# Patient Record
Sex: FEMALE | Race: WHITE | NOT HISPANIC OR LATINO | Employment: UNEMPLOYED | URBAN - METROPOLITAN AREA
[De-identification: names, ages, dates, MRNs, and addresses within clinical notes are randomized per-mention and may not be internally consistent; named-entity substitution may affect disease eponyms.]

---

## 2018-04-23 ENCOUNTER — HISTORICAL (OUTPATIENT)
Dept: LAB | Facility: HOSPITAL | Age: 38
End: 2018-04-23

## 2018-04-23 LAB
ABS NEUT (OLG): 5.03 X10(3)/MCL (ref 2.1–9.2)
ALBUMIN SERPL-MCNC: 4 GM/DL (ref 3.4–5)
ALBUMIN/GLOB SERPL: 1.3 {RATIO}
ALP SERPL-CCNC: 74 UNIT/L (ref 38–126)
ALT SERPL-CCNC: 69 UNIT/L (ref 12–78)
APPEARANCE, UA: CLEAR
AST SERPL-CCNC: 69 UNIT/L (ref 15–37)
BACTERIA SPEC CULT: NORMAL /HPF
BASOPHILS # BLD AUTO: 0 X10(3)/MCL (ref 0–0.2)
BASOPHILS NFR BLD AUTO: 1 %
BILIRUB SERPL-MCNC: 0.4 MG/DL (ref 0.2–1)
BILIRUB UR QL STRIP: NEGATIVE
BILIRUBIN DIRECT+TOT PNL SERPL-MCNC: 0.1 MG/DL (ref 0–0.2)
BILIRUBIN DIRECT+TOT PNL SERPL-MCNC: 0.3 MG/DL (ref 0–0.8)
BUN SERPL-MCNC: 12 MG/DL (ref 7–18)
CALCIUM SERPL-MCNC: 9.1 MG/DL (ref 8.5–10.1)
CHLORIDE SERPL-SCNC: 107 MMOL/L (ref 98–107)
CHOLEST SERPL-MCNC: 209 MG/DL (ref 0–200)
CHOLEST/HDLC SERPL: 5.8 {RATIO} (ref 0–4)
CO2 SERPL-SCNC: 23 MMOL/L (ref 21–32)
COLOR UR: YELLOW
CREAT SERPL-MCNC: 0.63 MG/DL (ref 0.55–1.02)
DEPRECATED CALCIDIOL+CALCIFEROL SERPL-MC: 18 NG/ML (ref 30–80)
EOSINOPHIL # BLD AUTO: 0.1 X10(3)/MCL (ref 0–0.9)
EOSINOPHIL NFR BLD AUTO: 1 %
ERYTHROCYTE [DISTWIDTH] IN BLOOD BY AUTOMATED COUNT: 12 % (ref 11.5–17)
EST. AVERAGE GLUCOSE BLD GHB EST-MCNC: 114 MG/DL
GLOBULIN SER-MCNC: 3.1 GM/DL (ref 2.4–3.5)
GLUCOSE (UA): NEGATIVE
GLUCOSE SERPL-MCNC: 101 MG/DL (ref 74–106)
HBA1C MFR BLD: 5.6 % (ref 4.2–6.3)
HCT VFR BLD AUTO: 41.1 % (ref 37–47)
HDLC SERPL-MCNC: 36 MG/DL (ref 35–60)
HGB BLD-MCNC: 13.2 GM/DL (ref 12–16)
HGB UR QL STRIP: NEGATIVE
KETONES UR QL STRIP: NEGATIVE
LDLC SERPL CALC-MCNC: 114 MG/DL (ref 0–129)
LEUKOCYTE ESTERASE UR QL STRIP: NEGATIVE
LYMPHOCYTES # BLD AUTO: 1.9 X10(3)/MCL (ref 0.6–4.6)
LYMPHOCYTES NFR BLD AUTO: 25 %
MCH RBC QN AUTO: 28.6 PG (ref 27–31)
MCHC RBC AUTO-ENTMCNC: 32.1 GM/DL (ref 33–36)
MCV RBC AUTO: 89 FL (ref 80–94)
MONOCYTES # BLD AUTO: 0.7 X10(3)/MCL (ref 0.1–1.3)
MONOCYTES NFR BLD AUTO: 9 %
NEUTROPHILS # BLD AUTO: 5.03 X10(3)/MCL (ref 1.4–7.9)
NEUTROPHILS NFR BLD AUTO: 64 %
NITRITE UR QL STRIP: NEGATIVE
PH UR STRIP: 7 [PH] (ref 5–9)
PLATELET # BLD AUTO: 281 X10(3)/MCL (ref 130–400)
PMV BLD AUTO: 9.8 FL (ref 9.4–12.4)
POTASSIUM SERPL-SCNC: 4.3 MMOL/L (ref 3.5–5.1)
PROT SERPL-MCNC: 7.1 GM/DL (ref 6.4–8.2)
PROT UR QL STRIP: NEGATIVE
RBC # BLD AUTO: 4.62 X10(6)/MCL (ref 4.2–5.4)
RBC #/AREA URNS HPF: NORMAL /[HPF]
SODIUM SERPL-SCNC: 140 MMOL/L (ref 136–145)
SP GR UR STRIP: 1.01 (ref 1–1.03)
SQUAMOUS EPITHELIAL, UA: 1 /HPF (ref 0–4)
TRIGL SERPL-MCNC: 295 MG/DL (ref 30–150)
TSH SERPL-ACNC: 1.14 MIU/L (ref 0.36–3.74)
UA WBC MAN: NORMAL
UROBILINOGEN UR STRIP-ACNC: 0.2
VLDLC SERPL CALC-MCNC: 59 MG/DL
WBC # SPEC AUTO: 7.8 X10(3)/MCL (ref 4.5–11.5)
WBC #/AREA URNS HPF: NORMAL /[HPF]

## 2019-05-29 ENCOUNTER — HISTORICAL (OUTPATIENT)
Dept: ADMINISTRATIVE | Facility: HOSPITAL | Age: 39
End: 2019-05-29

## 2019-05-29 LAB
ABS NEUT (OLG): 4.57 X10(3)/MCL (ref 2.1–9.2)
ALBUMIN SERPL-MCNC: 4 GM/DL (ref 3.4–5)
ALBUMIN/GLOB SERPL: 1.2 RATIO (ref 1.1–2)
ALP SERPL-CCNC: 80 UNIT/L (ref 38–126)
ALT SERPL-CCNC: 47 UNIT/L (ref 12–78)
APPEARANCE, UA: CLEAR
AST SERPL-CCNC: 25 UNIT/L (ref 15–37)
BACTERIA SPEC CULT: NORMAL /HPF
BASOPHILS # BLD AUTO: 0 X10(3)/MCL (ref 0–0.2)
BASOPHILS NFR BLD AUTO: 1 %
BILIRUB SERPL-MCNC: 0.7 MG/DL (ref 0.2–1)
BILIRUB UR QL STRIP: NEGATIVE
BILIRUBIN DIRECT+TOT PNL SERPL-MCNC: 0.1 MG/DL (ref 0–0.5)
BILIRUBIN DIRECT+TOT PNL SERPL-MCNC: 0.6 MG/DL (ref 0–0.8)
BUN SERPL-MCNC: 10 MG/DL (ref 7–18)
CALCIUM SERPL-MCNC: 9.3 MG/DL (ref 8.5–10.1)
CHLORIDE SERPL-SCNC: 104 MMOL/L (ref 98–107)
CHOLEST SERPL-MCNC: 226 MG/DL (ref 0–200)
CHOLEST/HDLC SERPL: 5.1 {RATIO} (ref 0–4)
CO2 SERPL-SCNC: 22 MMOL/L (ref 21–32)
COLOR UR: YELLOW
CREAT SERPL-MCNC: 0.68 MG/DL (ref 0.55–1.02)
EOSINOPHIL # BLD AUTO: 0.1 X10(3)/MCL (ref 0–0.9)
EOSINOPHIL NFR BLD AUTO: 1 %
ERYTHROCYTE [DISTWIDTH] IN BLOOD BY AUTOMATED COUNT: 11.8 % (ref 11.5–17)
EST. AVERAGE GLUCOSE BLD GHB EST-MCNC: 111 MG/DL
GLOBULIN SER-MCNC: 3.4 GM/DL (ref 2.4–3.5)
GLUCOSE (UA): NEGATIVE
GLUCOSE SERPL-MCNC: 105 MG/DL (ref 74–106)
HBA1C MFR BLD: 5.5 % (ref 4.2–6.3)
HCT VFR BLD AUTO: 42.3 % (ref 37–47)
HDLC SERPL-MCNC: 44 MG/DL (ref 35–60)
HGB BLD-MCNC: 13.5 GM/DL (ref 12–16)
HGB UR QL STRIP: NEGATIVE
KETONES UR QL STRIP: NEGATIVE
LDLC SERPL CALC-MCNC: 133 MG/DL (ref 0–129)
LEUKOCYTE ESTERASE UR QL STRIP: NEGATIVE
LYMPHOCYTES # BLD AUTO: 2 X10(3)/MCL (ref 0.6–4.6)
LYMPHOCYTES NFR BLD AUTO: 27 %
MCH RBC QN AUTO: 27.8 PG (ref 27–31)
MCHC RBC AUTO-ENTMCNC: 31.9 GM/DL (ref 33–36)
MCV RBC AUTO: 87 FL (ref 80–94)
MONOCYTES # BLD AUTO: 0.6 X10(3)/MCL (ref 0.1–1.3)
MONOCYTES NFR BLD AUTO: 8 %
NEUTROPHILS # BLD AUTO: 4.57 X10(3)/MCL (ref 2.1–9.2)
NEUTROPHILS NFR BLD AUTO: 62 %
NITRITE UR QL STRIP: NEGATIVE
PH UR STRIP: 6.5 [PH] (ref 5–9)
PLATELET # BLD AUTO: 309 X10(3)/MCL (ref 130–400)
PMV BLD AUTO: 9.9 FL (ref 9.4–12.4)
POTASSIUM SERPL-SCNC: 4.4 MMOL/L (ref 3.5–5.1)
PROT SERPL-MCNC: 7.4 GM/DL (ref 6.4–8.2)
PROT UR QL STRIP: NEGATIVE
RBC # BLD AUTO: 4.86 X10(6)/MCL (ref 4.2–5.4)
RBC #/AREA URNS HPF: NORMAL /[HPF]
SODIUM SERPL-SCNC: 136 MMOL/L (ref 136–145)
SP GR UR STRIP: 1.01 (ref 1–1.03)
SQUAMOUS EPITHELIAL, UA: NORMAL
TRIGL SERPL-MCNC: 244 MG/DL (ref 30–150)
TSH SERPL-ACNC: 0.83 MIU/L (ref 0.36–3.74)
UROBILINOGEN UR STRIP-ACNC: 0.2
VLDLC SERPL CALC-MCNC: 49 MG/DL
WBC # SPEC AUTO: 7.4 X10(3)/MCL (ref 4.5–11.5)
WBC #/AREA URNS HPF: NORMAL /[HPF]

## 2020-01-14 ENCOUNTER — HISTORICAL (OUTPATIENT)
Dept: ADMINISTRATIVE | Facility: HOSPITAL | Age: 40
End: 2020-01-14

## 2020-01-14 LAB
ALBUMIN SERPL-MCNC: 4.1 GM/DL (ref 3.4–5)
ALP SERPL-CCNC: 79 UNIT/L (ref 38–126)
ALT SERPL-CCNC: 47 UNIT/L (ref 12–78)
AST SERPL-CCNC: 21 UNIT/L (ref 15–37)
BILIRUB SERPL-MCNC: 1.1 MG/DL (ref 0.2–1)
BILIRUBIN DIRECT+TOT PNL SERPL-MCNC: 0.1 MG/DL (ref 0–0.2)
BILIRUBIN DIRECT+TOT PNL SERPL-MCNC: 1 MG/DL (ref 0–0.8)
CHOLEST SERPL-MCNC: 227 MG/DL (ref 0–200)
CHOLEST/HDLC SERPL: 5.2 {RATIO} (ref 0–4)
DEPRECATED CALCIDIOL+CALCIFEROL SERPL-MC: 26.28 NG/ML (ref 30–80)
HDLC SERPL-MCNC: 44 MG/DL (ref 35–60)
LDLC SERPL CALC-MCNC: 140 MG/DL (ref 0–129)
LIVER PROFILE INTERP: ABNORMAL
PROT SERPL-MCNC: 7 GM/DL (ref 6.4–8.2)
TRIGL SERPL-MCNC: 216 MG/DL (ref 30–150)
VLDLC SERPL CALC-MCNC: 43 MG/DL

## 2020-09-23 ENCOUNTER — HISTORICAL (OUTPATIENT)
Dept: ADMINISTRATIVE | Facility: HOSPITAL | Age: 40
End: 2020-09-23

## 2020-10-06 ENCOUNTER — HISTORICAL (OUTPATIENT)
Dept: ADMINISTRATIVE | Facility: HOSPITAL | Age: 40
End: 2020-10-06

## 2021-01-15 ENCOUNTER — HISTORICAL (OUTPATIENT)
Dept: ADMINISTRATIVE | Facility: HOSPITAL | Age: 41
End: 2021-01-15

## 2021-01-15 LAB
ABS NEUT (OLG): 4.67 X10(3)/MCL (ref 2.1–9.2)
ALBUMIN SERPL-MCNC: 4.5 GM/DL (ref 3.5–5)
ALBUMIN/GLOB SERPL: 1.6 RATIO (ref 1.1–2)
ALP SERPL-CCNC: 53 UNIT/L (ref 40–150)
ALT SERPL-CCNC: 16 UNIT/L (ref 0–55)
APPEARANCE, UA: CLEAR
AST SERPL-CCNC: 15 UNIT/L (ref 5–34)
BACTERIA SPEC CULT: NORMAL /HPF
BASOPHILS # BLD AUTO: 0 X10(3)/MCL (ref 0–0.2)
BASOPHILS NFR BLD AUTO: 1 %
BILIRUB SERPL-MCNC: 0.5 MG/DL
BILIRUB UR QL STRIP: NEGATIVE
BILIRUBIN DIRECT+TOT PNL SERPL-MCNC: 0.2 MG/DL (ref 0–0.5)
BILIRUBIN DIRECT+TOT PNL SERPL-MCNC: 0.3 MG/DL (ref 0–0.8)
BUN SERPL-MCNC: 15.5 MG/DL (ref 7–18.7)
CALCIUM SERPL-MCNC: 9.2 MG/DL (ref 8.4–10.2)
CHLORIDE SERPL-SCNC: 107 MMOL/L (ref 98–107)
CHOLEST SERPL-MCNC: 197 MG/DL
CHOLEST/HDLC SERPL: 4 {RATIO} (ref 0–5)
CO2 SERPL-SCNC: 23 MMOL/L (ref 22–29)
COLOR UR: YELLOW
CREAT SERPL-MCNC: 0.69 MG/DL (ref 0.55–1.02)
DEPRECATED CALCIDIOL+CALCIFEROL SERPL-MC: 22.6 NG/ML (ref 30–80)
EOSINOPHIL # BLD AUTO: 0.1 X10(3)/MCL (ref 0–0.9)
EOSINOPHIL NFR BLD AUTO: 1 %
ERYTHROCYTE [DISTWIDTH] IN BLOOD BY AUTOMATED COUNT: 11.9 % (ref 11.5–17)
EST. AVERAGE GLUCOSE BLD GHB EST-MCNC: 105.4 MG/DL
GLOBULIN SER-MCNC: 2.8 GM/DL (ref 2.4–3.5)
GLUCOSE (UA): NEGATIVE
GLUCOSE SERPL-MCNC: 99 MG/DL (ref 74–100)
HBA1C MFR BLD: 5.3 %
HCT VFR BLD AUTO: 41.1 % (ref 37–47)
HDLC SERPL-MCNC: 47 MG/DL (ref 35–60)
HGB BLD-MCNC: 13.4 GM/DL (ref 12–16)
HGB UR QL STRIP: NEGATIVE
KETONES UR QL STRIP: NEGATIVE
LDLC SERPL CALC-MCNC: 129 MG/DL (ref 50–140)
LEUKOCYTE ESTERASE UR QL STRIP: NEGATIVE
LYMPHOCYTES # BLD AUTO: 1.9 X10(3)/MCL (ref 0.6–4.6)
LYMPHOCYTES NFR BLD AUTO: 26 %
MCH RBC QN AUTO: 28 PG (ref 27–31)
MCHC RBC AUTO-ENTMCNC: 32.6 GM/DL (ref 33–36)
MCV RBC AUTO: 86 FL (ref 80–94)
MONOCYTES # BLD AUTO: 0.6 X10(3)/MCL (ref 0.1–1.3)
MONOCYTES NFR BLD AUTO: 8 %
NEUTROPHILS # BLD AUTO: 4.67 X10(3)/MCL (ref 2.1–9.2)
NEUTROPHILS NFR BLD AUTO: 64 %
NITRITE UR QL STRIP: NEGATIVE
PH UR STRIP: 6.5 [PH] (ref 5–9)
PLATELET # BLD AUTO: 304 X10(3)/MCL (ref 130–400)
PMV BLD AUTO: 9.7 FL (ref 9.4–12.4)
POTASSIUM SERPL-SCNC: 4.6 MMOL/L (ref 3.5–5.1)
PROT SERPL-MCNC: 7.3 GM/DL (ref 6.4–8.3)
PROT UR QL STRIP: NEGATIVE
RBC # BLD AUTO: 4.78 X10(6)/MCL (ref 4.2–5.4)
RBC #/AREA URNS HPF: NORMAL /[HPF]
SODIUM SERPL-SCNC: 137 MMOL/L (ref 136–145)
SP GR UR STRIP: 1.01 (ref 1–1.03)
SQUAMOUS EPITHELIAL, UA: NORMAL
TRIGL SERPL-MCNC: 107 MG/DL (ref 37–140)
TSH SERPL-ACNC: 0.82 UIU/ML (ref 0.35–4.94)
UROBILINOGEN UR STRIP-ACNC: 0.2
VLDLC SERPL CALC-MCNC: 21 MG/DL
WBC # SPEC AUTO: 7.3 X10(3)/MCL (ref 4.5–11.5)
WBC #/AREA URNS HPF: NORMAL /[HPF]

## 2022-02-02 ENCOUNTER — HISTORICAL (OUTPATIENT)
Dept: ADMINISTRATIVE | Facility: HOSPITAL | Age: 42
End: 2022-02-02

## 2022-02-02 LAB
ABS NEUT (OLG): 4.54 (ref 2.1–9.2)
ALBUMIN SERPL-MCNC: 4.3 G/DL (ref 3.5–5)
ALBUMIN/GLOB SERPL: 1.3 {RATIO} (ref 1.1–2)
ALP SERPL-CCNC: 65 U/L (ref 40–150)
ALT SERPL-CCNC: 25 U/L (ref 0–55)
APPEARANCE, UA: CLEAR
AST SERPL-CCNC: 22 U/L (ref 5–34)
BACTERIA SPEC CULT: NORMAL
BASOPHILS # BLD AUTO: 0.1 10*3/UL (ref 0–0.2)
BASOPHILS NFR BLD AUTO: 1 %
BILIRUB SERPL-MCNC: 0.6 MG/DL
BILIRUB UR QL STRIP: NEGATIVE
BILIRUBIN DIRECT+TOT PNL SERPL-MCNC: 0.2 (ref 0–0.5)
BILIRUBIN DIRECT+TOT PNL SERPL-MCNC: 0.4 (ref 0–0.8)
BUDDING YEAST: NORMAL
BUN SERPL-MCNC: 10.9 MG/DL (ref 7–18.7)
CALCIUM SERPL-MCNC: 9.3 MG/DL (ref 8.7–10.5)
CASTS, UA: NORMAL
CHLORIDE SERPL-SCNC: 106 MMOL/L (ref 98–107)
CHOLEST SERPL-MCNC: 239 MG/DL
CHOLEST/HDLC SERPL: 5 {RATIO} (ref 0–5)
CO2 SERPL-SCNC: 22 MMOL/L (ref 22–29)
COLOR UR: YELLOW
CREAT SERPL-MCNC: 0.66 MG/DL (ref 0.55–1.02)
CRYSTALS: NORMAL
DEPRECATED CALCIDIOL+CALCIFEROL SERPL-MC: 21 NG/ML (ref 30–80)
EOSINOPHIL # BLD AUTO: 0 10*3/UL (ref 0–0.9)
EOSINOPHIL NFR BLD AUTO: 1 %
ERYTHROCYTE [DISTWIDTH] IN BLOOD BY AUTOMATED COUNT: 11.9 % (ref 11.5–17)
GLOBULIN SER-MCNC: 3.2 G/DL (ref 2.4–3.5)
GLUCOSE (UA): NEGATIVE
GLUCOSE SERPL-MCNC: 94 MG/DL (ref 74–100)
HCT VFR BLD AUTO: 40.3 % (ref 37–47)
HDLC SERPL-MCNC: 49 MG/DL (ref 35–60)
HEMOLYSIS INTERF INDEX SERPL-ACNC: 2
HGB BLD-MCNC: 13.1 G/DL (ref 12–16)
HGB UR QL STRIP: NEGATIVE
ICTERIC INTERF INDEX SERPL-ACNC: 1
KETONES UR QL STRIP: NEGATIVE
LDLC SERPL CALC-MCNC: 136 MG/DL (ref 50–140)
LEUKOCYTE ESTERASE UR QL STRIP: NEGATIVE
LIPEMIC INTERF INDEX SERPL-ACNC: 9
LYMPHOCYTES # BLD AUTO: 1.7 10*3/UL (ref 0.6–4.6)
LYMPHOCYTES NFR BLD AUTO: 24 %
MANUAL DIFF? (OHS): NO
MCH RBC QN AUTO: 27.8 PG (ref 27–31)
MCHC RBC AUTO-ENTMCNC: 32.5 G/DL (ref 33–36)
MCV RBC AUTO: 85.6 FL (ref 80–94)
MONOCYTES # BLD AUTO: 0.5 10*3/UL (ref 0.1–1.3)
MONOCYTES NFR BLD AUTO: 8 %
NEUTROPHILS # BLD AUTO: 4.54 10*3/UL (ref 2.1–9.2)
NEUTROPHILS NFR BLD AUTO: 66 %
NITRITE UR QL STRIP: NEGATIVE
PH UR STRIP: 6.5 [PH] (ref 5–9)
PLATELET # BLD AUTO: 337 10*3/UL (ref 130–400)
PMV BLD AUTO: 9.6 FL (ref 9.4–12.4)
POTASSIUM SERPL-SCNC: 3.9 MMOL/L (ref 3.5–5.1)
PROT SERPL-MCNC: 7.5 G/DL (ref 6.4–8.3)
PROT UR QL STRIP: NEGATIVE
RBC # BLD AUTO: 4.71 10*6/UL (ref 4.2–5.4)
RBC #/AREA URNS HPF: NORMAL /[HPF] (ref 0–2)
SMALL ROUND CELLS, UA: NORMAL
SODIUM SERPL-SCNC: 137 MMOL/L (ref 136–145)
SP GR UR STRIP: 1.01 (ref 1–1.03)
SPERM URNS QL MICRO: NORMAL
SQUAMOUS EPITHELIAL, UA: NORMAL (ref 0–4)
TRIGL SERPL-MCNC: 271 MG/DL (ref 37–140)
TSH SERPL-ACNC: 0.84 M[IU]/L (ref 0.35–4.94)
UROBILINOGEN UR STRIP-ACNC: 0.2
VLDLC SERPL CALC-MCNC: 54 MG/DL
WBC # SPEC AUTO: 6.9 10*3/UL (ref 4.5–11.5)
WBC #/AREA URNS HPF: NORMAL /[HPF] (ref 0–2)

## 2022-02-16 ENCOUNTER — HISTORICAL (OUTPATIENT)
Dept: RADIOLOGY | Facility: HOSPITAL | Age: 42
End: 2022-02-16

## 2022-02-16 ENCOUNTER — HISTORICAL (OUTPATIENT)
Dept: ADMINISTRATIVE | Facility: HOSPITAL | Age: 42
End: 2022-02-16

## 2022-04-10 ENCOUNTER — HISTORICAL (OUTPATIENT)
Dept: ADMINISTRATIVE | Facility: HOSPITAL | Age: 42
End: 2022-04-10
Payer: COMMERCIAL

## 2022-04-26 VITALS
DIASTOLIC BLOOD PRESSURE: 74 MMHG | HEIGHT: 62 IN | OXYGEN SATURATION: 98 % | WEIGHT: 181.69 LBS | SYSTOLIC BLOOD PRESSURE: 108 MMHG | BODY MASS INDEX: 33.43 KG/M2

## 2022-05-02 NOTE — HISTORICAL [facility]ER
This is a historical note converted from Merry. Formatting and pictures may have been removed.  Please reference Merry for original formatting and attached multimedia. Chief Complaint  2 WEEK F/U RIGHT GREAT TOE FRACTURE HERE TODAY FOR X-RAYS.  History of Present Illness  40-year-old female presents here follow-up of great toe fracture. ?This was treated nonoperatively. ?Overall she is doing fairly well. ?Feels that her toe is improving.? Only issue is when somebody steps on it or clicks.? Otherwise she is using a hard soled shoe.? Pain is controlled currently and she is happy with her recovery thus far.  Review of Systems  Denies fevers, chills, chest pain, shortness of breath. Comprehensive review of systems performed and otherwise negative except as noted in HPI.  Physical Exam  Vitals & Measurements  T:?98.2? ?F (Oral)? BP:?121/80?  HT:?158.00?cm? WT:?84.820?kg? BMI:?33.98? LMP:?10/04/2020 00:00 CDT?  General: No acute distress, alert and oriented, healthy appearing?  HEENT: Head is atraumatic, mucous membranes are moist  Cardiovascular: Brisk capillary refill  Lungs: Breathing non-labored  Skin: no rashes appreciated  Neurologic: Sensation is grossly intact distally  ?  Right foot:  Brisk cap refill distally. ?Overall alignment is unchanged.? Sensation is intact distally.  Assessment/Plan  1.?Fracture of phalanx of right great toe?S92.401A  ?Patients right great toe fracture. ?Overall alignment is unchanged. ?Continue?to treat nonoperatively.? Continue hard soled shoe and marcus taping for the next 4 weeks. ?We will see her back in a month with repeat x-rays.  Ordered:  Clinic Follow up, *Est. 11/03/20 3:00:00 CST, Order for future visit, Fracture of phalanx of right great toe, Orthopaedics  Office/Outpatient Visit Level 3 Established [id number] [facility], Fracture of phalanx of right great toe, Orthopaedics Clinic, 10/06/20 11:27:00 CDT  XR Foot Right Minimum 3 Views, Routine, *Est. 11/03/20 3:00:00 CST, None,  Ambulatory, Rad Type, Order for future visit, Fracture of phalanx of right great toe, Not Scheduled, *Est. 20 3:00:00 CST  ?  Referrals  Clinic Follow up, *Est. 20 3:00:00 CST, Order for future visit, Fracture of phalanx of right great toe, [facility]   Problem List/Past Medical History  Ongoing  Knowledge deficit  Obesity  Historical  Anemia  none  Procedure/Surgical History  Low cervical  section (2013)  C-Sectiopn Xs 1  Cholecystectomy   Medications  bupivacaine 0.25% preservative-free injectable solution, 10 mL/EA, EPI, Once  Fish Oil oral capsule, 1 cap(s), Oral, Daily  Multivitamin (MVI) additive  red yeast rice 600 mg oral capsule, 1200 mg= 2 cap(s), Oral, Daily  tetanus/diphtheria/pertussis, acel (Tdap) 5 units-2 units-15.5 mcg/0.5 mL intramuscular suspension, 0.5 mL, IM, Once  Vitamin D3  Allergies  No Known Allergies  Social History  Abuse/Neglect  No, No, Yes, 10/06/2020  Alcohol - Denies Alcohol Use, 2013  Current, Wine, Liquor, 1-2 times per year, 2018  Employment/School  Unemployed, 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  Immunizations  Vaccine Date Status   tetanus/diphtheria/pertussis, acel(Tdap) 2013 Recorded   Health Maintenance  Health Maintenance  ???Pending?(in the next year)  ??? ??OverDue  ??? ? ? ?Cervical Cancer Screening due??10/15/17??and every 3??year(s)  ??? ? ? ?Diabetes Screening due??06/19/20??and every 1??year(s)  ??? ??Due?  ??? ? ? ?Influenza Vaccine  due??10/01/20??and every 1??day(s)  ??? ??Due In Future?  ??? ? ? ?Obesity Screening not due until??01/01/21??and every 1??year(s)  ??? ? ? ?Alcohol Misuse Screening not due until??01/02/21??and every 1??year(s)  ??? ? ? ?ADL Screening not due until??01/15/21??and every 1??year(s)  ??? ? ? ?Depression Screening not due until??05/29/21??and every 1??year(s)  ??? ? ? ?Blood Pressure Screening not due until??09/24/21??and every 1??year(s)  ??? ? ? ?Body Mass Index Check not due until??09/24/21??and every 1??year(s)  ???Satisfied?(in the past 1 year)  ??? ??Satisfied?  ??? ? ? ?ADL Screening on??01/15/20.??Satisfied by Columba Herbert LPN  ??? ? ? ?Alcohol Misuse Screening on??09/24/20.??Satisfied by Dorothea Brand  ??? ? ? ?Blood Pressure Screening on??10/06/20.??Satisfied by Dorothea Brand  ??? ? ? ?Body Mass Index Check on??10/06/20.??Satisfied by Dorothea Brand  ??? ? ? ?Cervical Cancer Screening on??01/15/20.??Satisfied by Boy Yarbrough MD  ??? ? ? ?Depression Screening on??05/29/20.??Satisfied by Columba Herbert LPN  ??? ? ? ?Obesity Screening on??10/06/20.??Satisfied by Dorothea Brand  ?  Diagnostic Results  AP lateral right great toe reviewed. ?Patients fracture?alignment is unchanged.? No callus thus far.

## 2022-11-08 ENCOUNTER — OFFICE VISIT (OUTPATIENT)
Dept: ORTHOPEDICS | Facility: CLINIC | Age: 42
End: 2022-11-08
Payer: COMMERCIAL

## 2022-11-08 VITALS
HEIGHT: 62 IN | SYSTOLIC BLOOD PRESSURE: 124 MMHG | BODY MASS INDEX: 33.31 KG/M2 | WEIGHT: 181 LBS | HEART RATE: 85 BPM | DIASTOLIC BLOOD PRESSURE: 74 MMHG

## 2022-11-08 DIAGNOSIS — M25.532 BILATERAL WRIST PAIN: Primary | ICD-10-CM

## 2022-11-08 DIAGNOSIS — M25.531 BILATERAL WRIST PAIN: Primary | ICD-10-CM

## 2022-11-08 DIAGNOSIS — S53.401A ELBOW SPRAIN, RIGHT, INITIAL ENCOUNTER: ICD-10-CM

## 2022-11-08 PROCEDURE — 3078F DIAST BP <80 MM HG: CPT | Mod: CPTII,,, | Performed by: ORTHOPAEDIC SURGERY

## 2022-11-08 PROCEDURE — 99213 PR OFFICE/OUTPT VISIT, EST, LEVL III, 20-29 MIN: ICD-10-PCS | Mod: ,,, | Performed by: ORTHOPAEDIC SURGERY

## 2022-11-08 PROCEDURE — 3074F SYST BP LT 130 MM HG: CPT | Mod: CPTII,,, | Performed by: ORTHOPAEDIC SURGERY

## 2022-11-08 PROCEDURE — 3078F PR MOST RECENT DIASTOLIC BLOOD PRESSURE < 80 MM HG: ICD-10-PCS | Mod: CPTII,,, | Performed by: ORTHOPAEDIC SURGERY

## 2022-11-08 PROCEDURE — 3008F PR BODY MASS INDEX (BMI) DOCUMENTED: ICD-10-PCS | Mod: CPTII,,, | Performed by: ORTHOPAEDIC SURGERY

## 2022-11-08 PROCEDURE — 1159F PR MEDICATION LIST DOCUMENTED IN MEDICAL RECORD: ICD-10-PCS | Mod: CPTII,,, | Performed by: ORTHOPAEDIC SURGERY

## 2022-11-08 PROCEDURE — 99213 OFFICE O/P EST LOW 20 MIN: CPT | Mod: ,,, | Performed by: ORTHOPAEDIC SURGERY

## 2022-11-08 PROCEDURE — 3074F PR MOST RECENT SYSTOLIC BLOOD PRESSURE < 130 MM HG: ICD-10-PCS | Mod: CPTII,,, | Performed by: ORTHOPAEDIC SURGERY

## 2022-11-08 PROCEDURE — 3008F BODY MASS INDEX DOCD: CPT | Mod: CPTII,,, | Performed by: ORTHOPAEDIC SURGERY

## 2022-11-08 PROCEDURE — 1159F MED LIST DOCD IN RCRD: CPT | Mod: CPTII,,, | Performed by: ORTHOPAEDIC SURGERY

## 2022-11-08 RX ORDER — MELOXICAM 15 MG/1
15 TABLET ORAL DAILY
Qty: 30 TABLET | Refills: 0 | Status: SHIPPED | OUTPATIENT
Start: 2022-11-08 | End: 2022-12-06

## 2022-11-08 RX ORDER — NAPROXEN 500 MG/1
500 TABLET ORAL 2 TIMES DAILY
COMMUNITY
Start: 2022-11-03 | End: 2023-02-07

## 2022-11-08 RX ORDER — ONDANSETRON 4 MG/1
4 TABLET, ORALLY DISINTEGRATING ORAL EVERY 6 HOURS PRN
COMMUNITY
Start: 2022-05-23 | End: 2023-02-07

## 2022-11-08 NOTE — PROGRESS NOTES
[facility]  Past Medical History:   Diagnosis Date    Anemia        Past Surgical History:   Procedure Laterality Date     SECTION      CHOLECYSTECTOMY         Current Outpatient Medications   Medication Sig    meloxicam (MOBIC) 15 MG tablet Take 1 tablet (15 mg total) by mouth once daily.    naproxen (NAPROSYN) 500 MG tablet Take 500 mg by mouth 2 (two) times daily.    ondansetron (ZOFRAN-ODT) 4 MG TbDL Take 4 mg by mouth every 6 (six) hours as needed.     No current facility-administered medications for this visit.       Review of patient's allergies indicates:  No Known Allergies    Family History   Problem Relation Age of Onset    COPD Mother     Hypertension Mother     Diabetes Father     Hypertension Father     Kidney disease Maternal Grandmother        Social History     Socioeconomic History    Marital status:    Tobacco Use    Smoking status: Never    Smokeless tobacco: Never   Substance and Sexual Activity    Alcohol use: Yes     Comment: Rare occasion    Drug use: Never    Sexual activity: Yes     Partners: Male     Birth control/protection: Condom   Social History Narrative    ** Merged History Encounter **            Chief Complaint:   Chief Complaint   Patient presents with    Left Wrist - Pain     Bilateral wrist pain. Brought disc for Xray. Pt fell reaching for step ladder. Possible hairline fracture in right arm - pain is bad and has bad bruising in right arm. Pain in left wrist - can't put pressure on fingers.    Right Wrist - Pain       History of present illness:  42-year-old female presents today for evaluation of injury to right as well as left upper extremity.  Patient was fixing a garage door standing on extension stool, she fell off landing on outstretched right upper extremity and her left arm.  Patient with significant complaints of pain to both upper extremities.  She was seen in urgent care where x-rays were obtained of the right elbow and left wrist.  Possible fracture  noted.  Patient placed in braces and slings.  She is here today for follow-up evaluation of this.      Review of Systems:    Constitution: Negative for chills, fever, and sweats.  Negative for unexplained weight loss.    HENT:  Negative for headaches and blurry vision.    Cardiovascular:Negative for chest pain or irregular heart beat. Negative for hypertension.    Respiratory:  Negative for cough and shortness of breath.    Gastrointestinal: Negative for abdominal pain, heartburn, melena, nausea, and vomitting.    Genitourinary:  Negative bladder incontinence and dysuria.    Musculoskeletal:  See HPI    Neurological: Negative for numbness.    Psychiatric/Behavioral: Negative for depression.  The patient is not nervous/anxious.      Endocrine: Negative for polyuria    Hematologic/Lymphatic: Negative for bleeding problem.  Does not bruise/bleed easily.    Skin: Negative for poor would healing and rash      Physical Examination:    Vital Signs:    Vitals:    11/08/22 0930   BP: 124/74   Pulse: 85       Body mass index is 33.11 kg/m².    General: No acute distress, alert and oriented, healthy appearing    HEENT: Head is atraumatic, mucous membranes are moist    Neck: Supples, no JVD    Cardiovascular: Palpable dorsalis pedis and posterior tibial pulses, regular rate and rhythm to those pulses    Lungs: Breathing non-labored    Skin: no rashes appreciated    Neurologic: Can flex and extend knees, ankles, and toes. Sensation is grossly intact    Bilateral upper extremities:   Tenderness to palpation of distal radius bilaterally.  Tenderness palpation over the right in the lateral elbow.  Patient lacks about 5° of terminal extension on the right elbow.  Full flexion.  Stable to varus and valgus stress.  30° of palmar flexion and dorsiflexion of bilateral wrists.  No significant instability noted.  X-rays:  X-rays of the right elbow and left wrist reviewed.  No displaced fracture noted.     Assessment::  Right elbow sprain.   Bilateral wrist sprains    Plan:  Discussed all treatment options the patient.  We will put on some anti-inflammatories.  She is going to work on range of motion at home.  If she has issues regaining range of motion will plan to start her on some physical therapy.  Repeat x-rays of right elbow bilateral wrists in 7-10 days.    This note was created using [employer] voice recognition software that occasionally misinterpreted phrases or words.    Consult note is delivered via Epic messaging service.

## 2022-11-17 ENCOUNTER — HOSPITAL ENCOUNTER (OUTPATIENT)
Dept: RADIOLOGY | Facility: CLINIC | Age: 42
Discharge: HOME | End: 2022-11-17
Attending: ORTHOPAEDIC SURGERY
Payer: COMMERCIAL

## 2022-11-17 ENCOUNTER — OFFICE VISIT (OUTPATIENT)
Dept: ORTHOPEDICS | Facility: CLINIC | Age: 42
End: 2022-11-17
Payer: COMMERCIAL

## 2022-11-17 VITALS
BODY MASS INDEX: 37.28 KG/M2 | WEIGHT: 203.81 LBS | HEART RATE: 74 BPM | DIASTOLIC BLOOD PRESSURE: 70 MMHG | SYSTOLIC BLOOD PRESSURE: 119 MMHG | TEMPERATURE: 98 F

## 2022-11-17 DIAGNOSIS — M25.532 BILATERAL WRIST PAIN: ICD-10-CM

## 2022-11-17 DIAGNOSIS — M25.532 BILATERAL WRIST PAIN: Primary | ICD-10-CM

## 2022-11-17 DIAGNOSIS — S53.401A ELBOW SPRAIN, RIGHT, INITIAL ENCOUNTER: ICD-10-CM

## 2022-11-17 DIAGNOSIS — M25.531 BILATERAL WRIST PAIN: ICD-10-CM

## 2022-11-17 DIAGNOSIS — M25.531 BILATERAL WRIST PAIN: Primary | ICD-10-CM

## 2022-11-17 PROCEDURE — 1159F MED LIST DOCD IN RCRD: CPT | Mod: CPTII,,, | Performed by: ORTHOPAEDIC SURGERY

## 2022-11-17 PROCEDURE — 99213 OFFICE O/P EST LOW 20 MIN: CPT | Mod: ,,, | Performed by: ORTHOPAEDIC SURGERY

## 2022-11-17 PROCEDURE — 1159F PR MEDICATION LIST DOCUMENTED IN MEDICAL RECORD: ICD-10-PCS | Mod: CPTII,,, | Performed by: ORTHOPAEDIC SURGERY

## 2022-11-17 PROCEDURE — 73110 XR WRIST COMPLETE 3 VIEWS RIGHT: ICD-10-PCS | Mod: RT,,, | Performed by: ORTHOPAEDIC SURGERY

## 2022-11-17 PROCEDURE — 3078F PR MOST RECENT DIASTOLIC BLOOD PRESSURE < 80 MM HG: ICD-10-PCS | Mod: CPTII,,, | Performed by: ORTHOPAEDIC SURGERY

## 2022-11-17 PROCEDURE — 73070 X-RAY EXAM OF ELBOW: CPT | Mod: RT,,, | Performed by: ORTHOPAEDIC SURGERY

## 2022-11-17 PROCEDURE — 73070 XR ELBOW 2 VIEWS RIGHT: ICD-10-PCS | Mod: RT,,, | Performed by: ORTHOPAEDIC SURGERY

## 2022-11-17 PROCEDURE — 3078F DIAST BP <80 MM HG: CPT | Mod: CPTII,,, | Performed by: ORTHOPAEDIC SURGERY

## 2022-11-17 PROCEDURE — 3074F SYST BP LT 130 MM HG: CPT | Mod: CPTII,,, | Performed by: ORTHOPAEDIC SURGERY

## 2022-11-17 PROCEDURE — 73110 X-RAY EXAM OF WRIST: CPT | Mod: RT,,, | Performed by: ORTHOPAEDIC SURGERY

## 2022-11-17 PROCEDURE — 3008F BODY MASS INDEX DOCD: CPT | Mod: CPTII,,, | Performed by: ORTHOPAEDIC SURGERY

## 2022-11-17 PROCEDURE — 3074F PR MOST RECENT SYSTOLIC BLOOD PRESSURE < 130 MM HG: ICD-10-PCS | Mod: CPTII,,, | Performed by: ORTHOPAEDIC SURGERY

## 2022-11-17 PROCEDURE — 99213 PR OFFICE/OUTPT VISIT, EST, LEVL III, 20-29 MIN: ICD-10-PCS | Mod: ,,, | Performed by: ORTHOPAEDIC SURGERY

## 2022-11-17 PROCEDURE — 73110 X-RAY EXAM OF WRIST: CPT | Mod: LT,,, | Performed by: ORTHOPAEDIC SURGERY

## 2022-11-17 PROCEDURE — 3008F PR BODY MASS INDEX (BMI) DOCUMENTED: ICD-10-PCS | Mod: CPTII,,, | Performed by: ORTHOPAEDIC SURGERY

## 2022-11-17 NOTE — PROGRESS NOTES
Past Medical History:   Diagnosis Date    Anemia        Past Surgical History:   Procedure Laterality Date     SECTION      CHOLECYSTECTOMY         Current Outpatient Medications   Medication Sig    meloxicam (MOBIC) 15 MG tablet Take 1 tablet (15 mg total) by mouth once daily.    naproxen (NAPROSYN) 500 MG tablet Take 500 mg by mouth 2 (two) times daily.    ondansetron (ZOFRAN-ODT) 4 MG TbDL Take 4 mg by mouth every 6 (six) hours as needed.     No current facility-administered medications for this visit.       Review of patient's allergies indicates:  No Known Allergies    Family History   Problem Relation Age of Onset    COPD Mother     Hypertension Mother     Diabetes Father     Hypertension Father     Kidney disease Maternal Grandmother        Social History     Socioeconomic History    Marital status:    Tobacco Use    Smoking status: Never    Smokeless tobacco: Never   Substance and Sexual Activity    Alcohol use: Yes     Comment: Rare occasion    Drug use: Never    Sexual activity: Yes     Partners: Male     Birth control/protection: Condom   Social History Narrative    ** Merged History Encounter **            Chief Complaint:   Chief Complaint   Patient presents with    Left Wrist - Follow-up    Right Wrist - Follow-up    Follow-up     wilma wrist/arm f/u  pt states able to do more and is getting better. pain only if pt strains hands/arms. pt wearing brace everyday. pt hasnt started PT yet.       History of present illness:  42-year-old female presents today for evaluation and follow-up of bilateral wrist pain status post fall.  She says she is much improved.  Having some pain although not as severe.  Range of motion is improving as well.      Review of Systems:    Constitution: Negative for chills, fever, and sweats.  Negative for unexplained weight loss.    HENT:  Negative for headaches and blurry vision.    Cardiovascular:Negative for chest pain or irregular heart beat. Negative for  hypertension.    Respiratory:  Negative for cough and shortness of breath.    Gastrointestinal: Negative for abdominal pain, heartburn, melena, nausea, and vomitting.    Genitourinary:  Negative bladder incontinence and dysuria.    Musculoskeletal:  See HPI    Neurological: Negative for numbness.    Psychiatric/Behavioral: Negative for depression.  The patient is not nervous/anxious.      Endocrine: Negative for polyuria    Hematologic/Lymphatic: Negative for bleeding problem.  Does not bruise/bleed easily.    Skin: Negative for poor would healing and rash      Physical Examination:    Vital Signs:    Vitals:    11/17/22 1108   BP: 119/70   Pulse: 74   Temp: 97.9 °F (36.6 °C)       Body mass index is 37.28 kg/m².    General: No acute distress, alert and oriented, healthy appearing    HEENT: Head is atraumatic, mucous membranes are moist    Neck: Supples, no JVD    Cardiovascular: Palpable dorsalis pedis and posterior tibial pulses, regular rate and rhythm to those pulses    Lungs: Breathing non-labored    Skin: no rashes appreciated    Neurologic: Can flex and extend knees, ankles, and toes. Sensation is grossly intact      Bilateral wrists:  Patient still has somewhat limited range of motion.  She lacks about 10° of dorsiflexion to the left side about 15 on the right.  Palmar flexion is fairly well-maintained bilaterally.  Has full pronation supination bilaterally although somewhat tight on the right.  Full range of motion of her fingers.  Ongoing swelling of the right wrist more than the left    X-rays:  X-rays of the left wrist as well as right wrist were reviewed today.  Three views.  No displaced fracture noted.  No arthritic change seen.  No ligamentous injury suspected.     Assessment::  Bilateral wrist tendinitis    Plan:  Patient is status post fall with likely underlying tendinitis.  She has cleared for range of motion as tolerated.  Weightbearing as tolerated.  We expect her range of motion to improve and  pain to decrease on a weekly basis over the next 3-4 weeks until she is back to full activities with no restriction in the next month.  If she has any ongoing issues, she will contact us.    This note was created using [employer] voice recognition software that occasionally misinterpreted phrases or words.    Consult note is delivered via Epic messaging service.

## 2022-12-08 ENCOUNTER — LAB VISIT (OUTPATIENT)
Dept: LAB | Facility: HOSPITAL | Age: 42
End: 2022-12-08
Attending: OBSTETRICS & GYNECOLOGY
Payer: COMMERCIAL

## 2022-12-08 DIAGNOSIS — N92.0 EXCESSIVE OR FREQUENT MENSTRUATION: ICD-10-CM

## 2022-12-08 DIAGNOSIS — E55.9 AVITAMINOSIS D: ICD-10-CM

## 2022-12-08 DIAGNOSIS — E34.9 ENDOCRINE DISORDER RELATED TO PUBERTY: ICD-10-CM

## 2022-12-08 DIAGNOSIS — Z01.419 ROUTINE GYNECOLOGICAL EXAMINATION: Primary | ICD-10-CM

## 2022-12-08 LAB
ALBUMIN SERPL-MCNC: 4 GM/DL (ref 3.5–5)
ALBUMIN/GLOB SERPL: 1.4 RATIO (ref 1.1–2)
ALP SERPL-CCNC: 73 UNIT/L (ref 40–150)
ALT SERPL-CCNC: 27 UNIT/L (ref 0–55)
AST SERPL-CCNC: 18 UNIT/L (ref 5–34)
BILIRUBIN DIRECT+TOT PNL SERPL-MCNC: 0.5 MG/DL
BUN SERPL-MCNC: 16.1 MG/DL (ref 7–18.7)
CALCIUM SERPL-MCNC: 9.2 MG/DL (ref 8.4–10.2)
CHLORIDE SERPL-SCNC: 107 MMOL/L (ref 98–107)
CHOLEST SERPL-MCNC: 225 MG/DL
CHOLEST/HDLC SERPL: 6 {RATIO} (ref 0–5)
CO2 SERPL-SCNC: 20 MMOL/L (ref 22–29)
CORTIS SERPL-SCNC: 10.2 UG/DL
CREAT SERPL-MCNC: 0.75 MG/DL (ref 0.55–1.02)
DEPRECATED CALCIDIOL+CALCIFEROL SERPL-MC: 20.2 NG/ML (ref 30–80)
ESTRADIOL SERPL HS-MCNC: 67 PG/ML
GFR SERPLBLD CREATININE-BSD FMLA CKD-EPI: >60 MLS/MIN/1.73/M2
GLOBULIN SER-MCNC: 2.8 GM/DL (ref 2.4–3.5)
GLUCOSE SERPL-MCNC: 106 MG/DL (ref 74–100)
HDLC SERPL-MCNC: 37 MG/DL (ref 35–60)
INSULIN SERPL-MCNC: 21.1 UU/ML
LDLC SERPL CALC-MCNC: 131 MG/DL (ref 50–140)
POTASSIUM SERPL-SCNC: 4.5 MMOL/L (ref 3.5–5.1)
PROGEST SERPL-MCNC: 5.5 NG/ML
PROT SERPL-MCNC: 6.8 GM/DL (ref 6.4–8.3)
SODIUM SERPL-SCNC: 137 MMOL/L (ref 136–145)
TRIGL SERPL-MCNC: 284 MG/DL (ref 37–140)
TSH SERPL-ACNC: 0.76 UIU/ML (ref 0.35–4.94)
VLDLC SERPL CALC-MCNC: 57 MG/DL

## 2022-12-08 PROCEDURE — 82306 VITAMIN D 25 HYDROXY: CPT

## 2022-12-08 PROCEDURE — 36415 COLL VENOUS BLD VENIPUNCTURE: CPT

## 2022-12-08 PROCEDURE — 80053 COMPREHEN METABOLIC PANEL: CPT

## 2022-12-08 PROCEDURE — 84443 ASSAY THYROID STIM HORMONE: CPT

## 2022-12-08 PROCEDURE — 83525 ASSAY OF INSULIN: CPT

## 2022-12-08 PROCEDURE — 84144 ASSAY OF PROGESTERONE: CPT

## 2022-12-08 PROCEDURE — 82670 ASSAY OF TOTAL ESTRADIOL: CPT

## 2022-12-08 PROCEDURE — 84402 ASSAY OF FREE TESTOSTERONE: CPT

## 2022-12-08 PROCEDURE — 82533 TOTAL CORTISOL: CPT

## 2022-12-08 PROCEDURE — 80061 LIPID PANEL: CPT

## 2022-12-09 ENCOUNTER — LAB VISIT (OUTPATIENT)
Dept: LAB | Facility: HOSPITAL | Age: 42
End: 2022-12-09
Attending: OBSTETRICS & GYNECOLOGY
Payer: COMMERCIAL

## 2022-12-09 DIAGNOSIS — Z01.419 ROUTINE GYNECOLOGICAL EXAMINATION: Primary | ICD-10-CM

## 2022-12-09 LAB
BASOPHILS # BLD AUTO: 0.07 X10(3)/MCL (ref 0–0.2)
BASOPHILS NFR BLD AUTO: 0.9 %
EOSINOPHIL # BLD AUTO: 0.08 X10(3)/MCL (ref 0–0.9)
EOSINOPHIL NFR BLD AUTO: 1 %
ERYTHROCYTE [DISTWIDTH] IN BLOOD BY AUTOMATED COUNT: 12.2 % (ref 11.5–17)
HCT VFR BLD AUTO: 39.9 % (ref 37–47)
HGB BLD-MCNC: 12.7 GM/DL (ref 12–16)
IMM GRANULOCYTES # BLD AUTO: 0.12 X10(3)/MCL (ref 0–0.04)
IMM GRANULOCYTES NFR BLD AUTO: 1.5 %
LYMPHOCYTES # BLD AUTO: 1.65 X10(3)/MCL (ref 0.6–4.6)
LYMPHOCYTES NFR BLD AUTO: 20.5 %
MCH RBC QN AUTO: 28.2 PG (ref 27–31)
MCHC RBC AUTO-ENTMCNC: 31.8 MG/DL (ref 33–36)
MCV RBC AUTO: 88.5 FL (ref 80–94)
MONOCYTES # BLD AUTO: 0.74 X10(3)/MCL (ref 0.1–1.3)
MONOCYTES NFR BLD AUTO: 9.2 %
NEUTROPHILS # BLD AUTO: 5.4 X10(3)/MCL (ref 2.1–9.2)
NEUTROPHILS NFR BLD AUTO: 66.9 %
NRBC BLD AUTO-RTO: 0 %
PLATELET # BLD AUTO: 376 X10(3)/MCL (ref 130–400)
PMV BLD AUTO: 9.8 FL (ref 7.4–10.4)
RBC # BLD AUTO: 4.51 X10(6)/MCL (ref 4.2–5.4)
WBC # SPEC AUTO: 8.1 X10(3)/MCL (ref 4.5–11.5)

## 2022-12-09 PROCEDURE — 36415 COLL VENOUS BLD VENIPUNCTURE: CPT

## 2022-12-09 PROCEDURE — 85025 COMPLETE CBC W/AUTO DIFF WBC: CPT

## 2022-12-19 LAB
TESTOST FREE SERPL-MCNC: 0.52 NG/DL
TESTOST SERPL-MCNC: 13 NG/DL (ref 8–60)

## 2023-01-31 ENCOUNTER — TELEPHONE (OUTPATIENT)
Dept: INTERNAL MEDICINE | Facility: CLINIC | Age: 43
End: 2023-01-31
Payer: COMMERCIAL

## 2023-01-31 DIAGNOSIS — Z13.29 SCREENING FOR HYPOTHYROIDISM: ICD-10-CM

## 2023-01-31 DIAGNOSIS — E55.9 VITAMIN D DEFICIENCY: ICD-10-CM

## 2023-01-31 DIAGNOSIS — Z00.00 WELLNESS EXAMINATION: Primary | ICD-10-CM

## 2023-01-31 NOTE — TELEPHONE ENCOUNTER
----- Message from Rogelio Owen MA sent at 1/31/2023  9:00 AM CST -----  Regarding: PV 2/7/23 @ 1:40 Dr. Godoy  1. Are there any outstanding tasks in the patient's chart? Yes, fasting labs    2. Is there any documentation in the chart? No    3.Has patient been seen in an ER, Urgent care clinic, or been admitted since last visit?  If yes, When, where, and why    4. Has patient seen any other healthcare providers since last visit?  If yes, when, where, and why    5. Has patient had any bloodwork or XR done since last visit?    6. Is patient signed up for patient portal?

## 2023-02-06 ENCOUNTER — TELEPHONE (OUTPATIENT)
Dept: INTERNAL MEDICINE | Facility: CLINIC | Age: 43
End: 2023-02-06
Payer: COMMERCIAL

## 2023-02-06 NOTE — TELEPHONE ENCOUNTER
----- Message from Silvia Tran sent at 2/6/2023 10:40 AM CST -----  Regarding: orders  .Caller is requesting to schedule their Lab appointment prior to annual appointment.  Order is not listed in EPIC.  Please enter order and contact patient to schedule.    Name of Caller:rigo    Preferred Date and Time of Labs:02/07/2023    Date of EPP Appointment:02/07/023    Where would they like the lab performed?[facility]    Would the patient rather a call back or a response via [facility]?     Best Call Back Number:[phone]    Additional Information: pt said when she was called about her appt no one informed her she needed to do labs her appt is tomorrow. Pls have have nurse/dr to put in orders and give pt a call when it's done so she can go it done.

## 2023-02-07 ENCOUNTER — OFFICE VISIT (OUTPATIENT)
Dept: INTERNAL MEDICINE | Facility: CLINIC | Age: 43
End: 2023-02-07
Payer: COMMERCIAL

## 2023-02-07 ENCOUNTER — LAB VISIT (OUTPATIENT)
Dept: LAB | Facility: HOSPITAL | Age: 43
End: 2023-02-07
Attending: INTERNAL MEDICINE
Payer: COMMERCIAL

## 2023-02-07 VITALS
TEMPERATURE: 98 F | BODY MASS INDEX: 38.28 KG/M2 | RESPIRATION RATE: 16 BRPM | DIASTOLIC BLOOD PRESSURE: 74 MMHG | HEIGHT: 62 IN | SYSTOLIC BLOOD PRESSURE: 136 MMHG | HEART RATE: 100 BPM | WEIGHT: 208 LBS | OXYGEN SATURATION: 99 %

## 2023-02-07 DIAGNOSIS — Z13.29 SCREENING FOR HYPOTHYROIDISM: ICD-10-CM

## 2023-02-07 DIAGNOSIS — E88.819 INSULIN RESISTANCE: ICD-10-CM

## 2023-02-07 DIAGNOSIS — E34.9 TESTOSTERONE DEFICIENCY: ICD-10-CM

## 2023-02-07 DIAGNOSIS — Z00.00 WELLNESS EXAMINATION: ICD-10-CM

## 2023-02-07 DIAGNOSIS — Z83.3 FAMILY HISTORY OF DIABETES MELLITUS: ICD-10-CM

## 2023-02-07 DIAGNOSIS — Z00.00 ANNUAL PHYSICAL EXAM: Primary | ICD-10-CM

## 2023-02-07 DIAGNOSIS — E55.9 VITAMIN D DEFICIENCY: ICD-10-CM

## 2023-02-07 DIAGNOSIS — E66.9 CLASS 1 OBESITY WITHOUT SERIOUS COMORBIDITY IN ADULT, UNSPECIFIED BMI, UNSPECIFIED OBESITY TYPE: ICD-10-CM

## 2023-02-07 DIAGNOSIS — R73.03 PREDIABETES: ICD-10-CM

## 2023-02-07 PROBLEM — E66.811 CLASS 1 OBESITY WITHOUT SERIOUS COMORBIDITY IN ADULT: Status: ACTIVE | Noted: 2023-02-07

## 2023-02-07 LAB
ALBUMIN SERPL-MCNC: 4 G/DL (ref 3.5–5)
ALBUMIN/GLOB SERPL: 1.4 RATIO (ref 1.1–2)
ALP SERPL-CCNC: 66 UNIT/L (ref 40–150)
ALT SERPL-CCNC: 38 UNIT/L (ref 0–55)
APPEARANCE UR: CLEAR
AST SERPL-CCNC: 20 UNIT/L (ref 5–34)
BACTERIA #/AREA URNS AUTO: NORMAL /HPF
BASOPHILS # BLD AUTO: 0.06 X10(3)/MCL (ref 0–0.2)
BASOPHILS NFR BLD AUTO: 0.7 %
BILIRUB UR QL STRIP.AUTO: NEGATIVE MG/DL
BILIRUBIN DIRECT+TOT PNL SERPL-MCNC: 0.4 MG/DL
BUN SERPL-MCNC: 14.9 MG/DL (ref 7–18.7)
CALCIUM SERPL-MCNC: 9.3 MG/DL (ref 8.4–10.2)
CHLORIDE SERPL-SCNC: 105 MMOL/L (ref 98–107)
CHOLEST SERPL-MCNC: 192 MG/DL
CHOLEST/HDLC SERPL: 5 {RATIO} (ref 0–5)
CO2 SERPL-SCNC: 20 MMOL/L (ref 22–29)
COLOR UR AUTO: YELLOW
CREAT SERPL-MCNC: 0.69 MG/DL (ref 0.55–1.02)
DEPRECATED CALCIDIOL+CALCIFEROL SERPL-MC: 21.1 NG/ML (ref 30–80)
EOSINOPHIL # BLD AUTO: 0.14 X10(3)/MCL (ref 0–0.9)
EOSINOPHIL NFR BLD AUTO: 1.5 %
ERYTHROCYTE [DISTWIDTH] IN BLOOD BY AUTOMATED COUNT: 11.9 % (ref 11.5–17)
EST. AVERAGE GLUCOSE BLD GHB EST-MCNC: 114 MG/DL
GFR SERPLBLD CREATININE-BSD FMLA CKD-EPI: >60 MLS/MIN/1.73/M2
GLOBULIN SER-MCNC: 2.9 GM/DL (ref 2.4–3.5)
GLUCOSE SERPL-MCNC: 103 MG/DL (ref 74–100)
GLUCOSE UR QL STRIP.AUTO: NEGATIVE MG/DL
HBA1C MFR BLD: 5.6 %
HCT VFR BLD AUTO: 40.6 % (ref 37–47)
HDLC SERPL-MCNC: 42 MG/DL (ref 35–60)
HGB BLD-MCNC: 12.9 GM/DL (ref 12–16)
IMM GRANULOCYTES # BLD AUTO: 0.14 X10(3)/MCL (ref 0–0.04)
IMM GRANULOCYTES NFR BLD AUTO: 1.5 %
KETONES UR QL STRIP.AUTO: NEGATIVE MG/DL
LDLC SERPL CALC-MCNC: 104 MG/DL (ref 50–140)
LEUKOCYTE ESTERASE UR QL STRIP.AUTO: NEGATIVE UNIT/L
LYMPHOCYTES # BLD AUTO: 2.57 X10(3)/MCL (ref 0.6–4.6)
LYMPHOCYTES NFR BLD AUTO: 28.1 %
MCH RBC QN AUTO: 27.9 PG
MCHC RBC AUTO-ENTMCNC: 31.8 MG/DL (ref 33–36)
MCV RBC AUTO: 87.9 FL (ref 80–94)
MONOCYTES # BLD AUTO: 0.86 X10(3)/MCL (ref 0.1–1.3)
MONOCYTES NFR BLD AUTO: 9.4 %
NEUTROPHILS # BLD AUTO: 5.37 X10(3)/MCL (ref 2.1–9.2)
NEUTROPHILS NFR BLD AUTO: 58.8 %
NITRITE UR QL STRIP.AUTO: NEGATIVE
NRBC BLD AUTO-RTO: 0 %
PH UR STRIP.AUTO: 6 [PH]
PLATELET # BLD AUTO: 346 X10(3)/MCL (ref 130–400)
PMV BLD AUTO: 9.8 FL (ref 7.4–10.4)
POTASSIUM SERPL-SCNC: 4.4 MMOL/L (ref 3.5–5.1)
PROT SERPL-MCNC: 6.9 GM/DL (ref 6.4–8.3)
PROT UR QL STRIP.AUTO: NEGATIVE MG/DL
RBC # BLD AUTO: 4.62 X10(6)/MCL (ref 4.2–5.4)
RBC #/AREA URNS AUTO: <5 /HPF
RBC UR QL AUTO: NEGATIVE UNIT/L
SODIUM SERPL-SCNC: 136 MMOL/L (ref 136–145)
SP GR UR STRIP.AUTO: 1.02 (ref 1–1.03)
SQUAMOUS #/AREA URNS AUTO: <5 /HPF
TRIGL SERPL-MCNC: 231 MG/DL (ref 37–140)
TSH SERPL-ACNC: 1.55 UIU/ML (ref 0.35–4.94)
UROBILINOGEN UR STRIP-ACNC: 0.2 MG/DL
VLDLC SERPL CALC-MCNC: 46 MG/DL
WBC # SPEC AUTO: 9.1 X10(3)/MCL (ref 4.5–11.5)
WBC #/AREA URNS AUTO: <5 /HPF

## 2023-02-07 PROCEDURE — 80053 COMPREHEN METABOLIC PANEL: CPT

## 2023-02-07 PROCEDURE — 1159F MED LIST DOCD IN RCRD: CPT | Mod: CPTII,,, | Performed by: INTERNAL MEDICINE

## 2023-02-07 PROCEDURE — 84443 ASSAY THYROID STIM HORMONE: CPT

## 2023-02-07 PROCEDURE — 83036 HEMOGLOBIN GLYCOSYLATED A1C: CPT

## 2023-02-07 PROCEDURE — 81001 URINALYSIS AUTO W/SCOPE: CPT

## 2023-02-07 PROCEDURE — 3078F PR MOST RECENT DIASTOLIC BLOOD PRESSURE < 80 MM HG: ICD-10-PCS | Mod: CPTII,,, | Performed by: INTERNAL MEDICINE

## 2023-02-07 PROCEDURE — 99396 PR PREVENTIVE VISIT,EST,40-64: ICD-10-PCS | Mod: ,,, | Performed by: INTERNAL MEDICINE

## 2023-02-07 PROCEDURE — 36415 COLL VENOUS BLD VENIPUNCTURE: CPT

## 2023-02-07 PROCEDURE — 3075F SYST BP GE 130 - 139MM HG: CPT | Mod: CPTII,,, | Performed by: INTERNAL MEDICINE

## 2023-02-07 PROCEDURE — 3078F DIAST BP <80 MM HG: CPT | Mod: CPTII,,, | Performed by: INTERNAL MEDICINE

## 2023-02-07 PROCEDURE — 1159F PR MEDICATION LIST DOCUMENTED IN MEDICAL RECORD: ICD-10-PCS | Mod: CPTII,,, | Performed by: INTERNAL MEDICINE

## 2023-02-07 PROCEDURE — 3008F BODY MASS INDEX DOCD: CPT | Mod: CPTII,,, | Performed by: INTERNAL MEDICINE

## 2023-02-07 PROCEDURE — 3008F PR BODY MASS INDEX (BMI) DOCUMENTED: ICD-10-PCS | Mod: CPTII,,, | Performed by: INTERNAL MEDICINE

## 2023-02-07 PROCEDURE — 85025 COMPLETE CBC W/AUTO DIFF WBC: CPT

## 2023-02-07 PROCEDURE — 80061 LIPID PANEL: CPT

## 2023-02-07 PROCEDURE — 82306 VITAMIN D 25 HYDROXY: CPT

## 2023-02-07 PROCEDURE — 3075F PR MOST RECENT SYSTOLIC BLOOD PRESS GE 130-139MM HG: ICD-10-PCS | Mod: CPTII,,, | Performed by: INTERNAL MEDICINE

## 2023-02-07 PROCEDURE — 99396 PREV VISIT EST AGE 40-64: CPT | Mod: ,,, | Performed by: INTERNAL MEDICINE

## 2023-02-07 RX ORDER — ORAL SEMAGLUTIDE 7 MG/1
7 TABLET ORAL DAILY
Qty: 30 TABLET | Refills: 0 | Status: SHIPPED | OUTPATIENT
Start: 2023-02-07 | End: 2023-02-09

## 2023-02-07 NOTE — PROGRESS NOTES
Subjective:      Patient ID: Caroline Garg is a 42 y.o. female.    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 Queen  Weight up 20lbs since last visit  Father with Diabetes  Low on testosterone-- started on 5mL cream that she started a few weks ago   A1c 5.6, glucose 103  Advised to log food  Ryblesus vs Ozempic discussed            Past Medical History:  Past Medical History:   Diagnosis Date    Anemia      Past Surgical History:   Procedure Laterality Date     SECTION      CHOLECYSTECTOMY       Review of patient's allergies indicates:  No Known Allergies  Current Outpatient Medications on File Prior to Visit   Medication Sig Dispense Refill    [DISCONTINUED] meloxicam (MOBIC) 15 MG tablet TAKE 1 TABLET BY MOUTH EVERY DAY 30 tablet 0    [DISCONTINUED] naproxen (NAPROSYN) 500 MG tablet Take 500 mg by mouth 2 (two) times daily.      [DISCONTINUED] ondansetron (ZOFRAN-ODT) 4 MG TbDL Take 4 mg by mouth every 6 (six) hours as needed.       No current facility-administered medications on file prior to visit.     Social History     Socioeconomic History    Marital status:    Tobacco Use    Smoking status: Never    Smokeless tobacco: Never   Substance and Sexual Activity    Alcohol use: Yes     Comment: Rare occasion    Drug use: Never    Sexual activity: Yes     Partners: Male     Birth control/protection: Condom   Social History Narrative    ** Merged History Encounter **          Family History   Problem Relation Age of Onset    COPD Mother     Hypertension Mother     Diabetes Father     Hypertension Father     Kidney disease Maternal Grandmother        Review of Systems  A comprehensive review of systems was performed and was negative with exception of what is documented above.     Objective:   /74 (BP Location: Left arm, Patient Position: Sitting, BP Method: Medium (Manual))   Pulse 100   Temp 97.8 °F (36.6 °C)  "(Temporal)   Resp 16   Ht 5' 2" (1.575 m)   Wt 94.3 kg (208 lb)   SpO2 99%   BMI 38.04 kg/m²   Physical Exam  General : Alert and oriented, No acute distress, afebrile. Obese   Eye : PERRLA. EOMI. Normal conjunctiva, Sclerae are nonicteric. No conjunctival injection, no pallor.  HEENT : Normocephalic/ atraumatic, Normal hearing, Oral mucosa is moist.  Respiratory : Respirations are non-labored and clear to auscultation bilaterally. Symmetrical air entry bilaterally, no crackles, no wheezes, no rhonchi. No cyanosis, no clubbing.  Cardiovascular : Normal rate, Regular rhythm. No murmurs, rubs, or gallops. Pulses are 2+ throughout. No JVD. No Edema.  Gastrointestinal : Soft, nontender, non-distended, bowel sounds are present in all quadrants, no organomegaly, no guarding, no rebound.  Musculoskeletal : Normal range of motion throughout. No muscle tenderness.  Integumentary : Warm, moist, intact.  Neurologic : Alert, Oriented  Psychiatric : Cooperative, Appropriate mood & affect.   Assessment/ Plan:   1. Annual physical exam  Assessment & Plan:  Age appropriate exams UTD  Labs reviewed      2. Testosterone deficiency    3. Prediabetes  Assessment & Plan:  Start Rybelsus 3mg  RTC 4 weeks  Advised on Noom  Minh increase to 7mg at next visit      4. Class 1 obesity without serious comorbidity in adult, unspecified BMI, unspecified obesity type    5. Insulin resistance    6. Family history of diabetes mellitus    Other orders  -     semaglutide (RYBELSUS) 7 mg tablet; Take 1 tablet (7 mg total) by mouth once daily.  Dispense: 30 tablet; Refill: 0             Follow up in about 4 weeks (around 3/7/2023) for NURSE PRACTITIONER; gerald revisit- increase to 7mg .    "

## 2023-02-09 RX ORDER — SEMAGLUTIDE 1.34 MG/ML
0.25 INJECTION, SOLUTION SUBCUTANEOUS
Qty: 1 PEN | Refills: 5 | Status: SHIPPED | OUTPATIENT
Start: 2023-02-09 | End: 2023-02-13

## 2023-02-13 ENCOUNTER — TELEPHONE (OUTPATIENT)
Dept: INTERNAL MEDICINE | Facility: CLINIC | Age: 43
End: 2023-02-13
Payer: COMMERCIAL

## 2023-02-13 RX ORDER — SEMAGLUTIDE 0.25 MG/.5ML
0.25 INJECTION, SOLUTION SUBCUTANEOUS
Qty: 0.5 ML | Refills: 0 | Status: SHIPPED | OUTPATIENT
Start: 2023-02-13

## 2023-02-13 NOTE — TELEPHONE ENCOUNTER
----- Message from Boy Yarbrough MD sent at 2/13/2023 10:58 AM CST -----  Going to be sending in [email or web address]  Stay tuned  ----- Message -----  From: Rogelio Owen MA  Sent: 2/13/2023  10:56 AM CST  To: Boy Yarbrough MD    PA Denied for Ozempic

## 2023-05-15 PROBLEM — Z00.00 ANNUAL PHYSICAL EXAM: Status: RESOLVED | Noted: 2023-02-07 | Resolved: 2023-05-15

## 2024-01-04 ENCOUNTER — HOSPITAL ENCOUNTER (OUTPATIENT)
Dept: RADIOLOGY | Facility: HOSPITAL | Age: 44
Discharge: HOME | End: 2024-01-04
Attending: INTERNAL MEDICINE
Payer: COMMERCIAL

## 2024-01-04 DIAGNOSIS — Z12.31 ENCOUNTER FOR SCREENING MAMMOGRAM FOR BREAST CANCER: ICD-10-CM

## 2024-01-04 PROCEDURE — 77067 SCR MAMMO BI INCL CAD: CPT | Mod: TC

## 2024-01-04 PROCEDURE — 77063 BREAST TOMOSYNTHESIS BI: CPT | Mod: 26,,, | Performed by: STUDENT IN AN ORGANIZED HEALTH CARE EDUCATION/TRAINING PROGRAM

## 2024-01-04 PROCEDURE — 77067 SCR MAMMO BI INCL CAD: CPT | Mod: 26,,, | Performed by: STUDENT IN AN ORGANIZED HEALTH CARE EDUCATION/TRAINING PROGRAM

## 2024-12-10 LAB
PAP RECOMMENDATION EXT: NORMAL
PAP SMEAR: NORMAL

## 2025-01-09 ENCOUNTER — TELEPHONE (OUTPATIENT)
Dept: INTERNAL MEDICINE | Facility: CLINIC | Age: 45
End: 2025-01-09
Payer: COMMERCIAL

## 2025-01-09 DIAGNOSIS — Z00.00 WELLNESS EXAMINATION: Primary | ICD-10-CM

## 2025-01-09 DIAGNOSIS — Z13.29 SCREENING FOR HYPOTHYROIDISM: ICD-10-CM

## 2025-01-09 DIAGNOSIS — E55.9 VITAMIN D DEFICIENCY: ICD-10-CM

## 2025-01-09 NOTE — TELEPHONE ENCOUNTER
----- Message from Med Assistant Mercado sent at 1/9/2025 11:36 AM CST -----  Regarding: PV 1/23/25 @ [id number] Dr. Godoy  1. Are there any outstanding tasks in the patient's chart? Yes, fasting labs    2. Is there any documentation in the chart? No    3.Has patient been seen in an ER, Urgent care clinic, or been admitted since last visit?  If yes, When, where, and why    4. Has patient seen any other healthcare providers since last visit?  If yes, when, where, and why    5. Has patient had any bloodwork or XR done since last visit?    6. Is patient signed up for patient portal?    7. Does patient have home blood pressure cuff?   If yes, please have patient bring to appointment for validation.

## 2025-01-20 ENCOUNTER — LAB VISIT (OUTPATIENT)
Dept: LAB | Facility: HOSPITAL | Age: 45
End: 2025-01-20
Attending: INTERNAL MEDICINE
Payer: COMMERCIAL

## 2025-01-20 DIAGNOSIS — E55.9 VITAMIN D DEFICIENCY: ICD-10-CM

## 2025-01-20 DIAGNOSIS — Z00.00 WELLNESS EXAMINATION: ICD-10-CM

## 2025-01-20 DIAGNOSIS — Z13.29 SCREENING FOR HYPOTHYROIDISM: ICD-10-CM

## 2025-01-20 LAB
25(OH)D3+25(OH)D2 SERPL-MCNC: 46 NG/ML (ref 30–80)
ALBUMIN SERPL-MCNC: 4 G/DL (ref 3.5–5)
ALBUMIN/GLOB SERPL: 1.3 RATIO (ref 1.1–2)
ALP SERPL-CCNC: 80 UNIT/L (ref 40–150)
ALT SERPL-CCNC: 29 UNIT/L (ref 0–55)
ANION GAP SERPL CALC-SCNC: 9 MEQ/L
AST SERPL-CCNC: 22 UNIT/L (ref 5–34)
BASOPHILS # BLD AUTO: 0.06 X10(3)/MCL
BASOPHILS NFR BLD AUTO: 0.7 %
BILIRUB SERPL-MCNC: 0.5 MG/DL
BILIRUB UR QL STRIP.AUTO: NEGATIVE
BUN SERPL-MCNC: 13 MG/DL (ref 7–18.7)
CALCIUM SERPL-MCNC: 9.2 MG/DL (ref 8.4–10.2)
CHLORIDE SERPL-SCNC: 107 MMOL/L (ref 98–107)
CHOLEST SERPL-MCNC: 223 MG/DL
CHOLEST/HDLC SERPL: 5 {RATIO} (ref 0–5)
CLARITY UR: CLEAR
CO2 SERPL-SCNC: 23 MMOL/L (ref 22–29)
COLOR UR AUTO: NORMAL
CREAT SERPL-MCNC: 0.8 MG/DL (ref 0.55–1.02)
CREAT/UREA NIT SERPL: 16
EOSINOPHIL # BLD AUTO: 0.09 X10(3)/MCL (ref 0–0.9)
EOSINOPHIL NFR BLD AUTO: 1 %
ERYTHROCYTE [DISTWIDTH] IN BLOOD BY AUTOMATED COUNT: 12.3 % (ref 11.5–17)
EST. AVERAGE GLUCOSE BLD GHB EST-MCNC: 114 MG/DL
GFR SERPLBLD CREATININE-BSD FMLA CKD-EPI: >60 ML/MIN/1.73/M2
GLOBULIN SER-MCNC: 3.2 GM/DL (ref 2.4–3.5)
GLUCOSE SERPL-MCNC: 128 MG/DL (ref 74–100)
GLUCOSE UR QL STRIP: NEGATIVE
HBA1C MFR BLD: 5.6 %
HCT VFR BLD AUTO: 40.3 % (ref 37–47)
HDLC SERPL-MCNC: 49 MG/DL (ref 35–60)
HGB BLD-MCNC: 13.2 G/DL (ref 12–16)
HGB UR QL STRIP: NEGATIVE
IMM GRANULOCYTES # BLD AUTO: 0.07 X10(3)/MCL (ref 0–0.04)
IMM GRANULOCYTES NFR BLD AUTO: 0.8 %
KETONES UR QL STRIP: NEGATIVE
LDLC SERPL CALC-MCNC: 122 MG/DL (ref 50–140)
LEUKOCYTE ESTERASE UR QL STRIP: NEGATIVE
LYMPHOCYTES # BLD AUTO: 2 X10(3)/MCL (ref 0.6–4.6)
LYMPHOCYTES NFR BLD AUTO: 22.5 %
MCH RBC QN AUTO: 28.6 PG (ref 27–31)
MCHC RBC AUTO-ENTMCNC: 32.8 G/DL (ref 33–36)
MCV RBC AUTO: 87.2 FL (ref 80–94)
MONOCYTES # BLD AUTO: 0.74 X10(3)/MCL (ref 0.1–1.3)
MONOCYTES NFR BLD AUTO: 8.3 %
NEUTROPHILS # BLD AUTO: 5.92 X10(3)/MCL (ref 2.1–9.2)
NEUTROPHILS NFR BLD AUTO: 66.7 %
NITRITE UR QL STRIP: NEGATIVE
NRBC BLD AUTO-RTO: 0 %
PH UR STRIP: 6 [PH]
PLATELET # BLD AUTO: 294 X10(3)/MCL (ref 130–400)
PMV BLD AUTO: 9.6 FL (ref 7.4–10.4)
POTASSIUM SERPL-SCNC: 4.3 MMOL/L (ref 3.5–5.1)
PROT SERPL-MCNC: 7.2 GM/DL (ref 6.4–8.3)
PROT UR QL STRIP: NEGATIVE
RBC # BLD AUTO: 4.62 X10(6)/MCL (ref 4.2–5.4)
SODIUM SERPL-SCNC: 139 MMOL/L (ref 136–145)
SP GR UR STRIP.AUTO: <=1.005 (ref 1–1.03)
TRIGL SERPL-MCNC: 259 MG/DL (ref 37–140)
TSH SERPL-ACNC: 1.31 UIU/ML (ref 0.35–4.94)
UROBILINOGEN UR STRIP-ACNC: 0.2
VLDLC SERPL CALC-MCNC: 52 MG/DL
WBC # BLD AUTO: 8.88 X10(3)/MCL (ref 4.5–11.5)

## 2025-01-20 PROCEDURE — 80061 LIPID PANEL: CPT

## 2025-01-20 PROCEDURE — 81003 URINALYSIS AUTO W/O SCOPE: CPT

## 2025-01-20 PROCEDURE — 83036 HEMOGLOBIN GLYCOSYLATED A1C: CPT

## 2025-01-20 PROCEDURE — 84443 ASSAY THYROID STIM HORMONE: CPT

## 2025-01-20 PROCEDURE — 85025 COMPLETE CBC W/AUTO DIFF WBC: CPT

## 2025-01-20 PROCEDURE — 82306 VITAMIN D 25 HYDROXY: CPT

## 2025-01-20 PROCEDURE — 80053 COMPREHEN METABOLIC PANEL: CPT

## 2025-01-20 PROCEDURE — 36415 COLL VENOUS BLD VENIPUNCTURE: CPT

## 2025-01-23 ENCOUNTER — PATIENT MESSAGE (OUTPATIENT)
Dept: INTERNAL MEDICINE | Facility: CLINIC | Age: 45
End: 2025-01-23

## 2025-01-23 ENCOUNTER — OFFICE VISIT (OUTPATIENT)
Dept: INTERNAL MEDICINE | Facility: CLINIC | Age: 45
End: 2025-01-23
Payer: COMMERCIAL

## 2025-01-23 ENCOUNTER — DOCUMENTATION ONLY (OUTPATIENT)
Facility: CLINIC | Age: 45
End: 2025-01-23
Payer: COMMERCIAL

## 2025-01-23 DIAGNOSIS — G47.8 AWAKENS FROM SLEEP AT NIGHT: ICD-10-CM

## 2025-01-23 DIAGNOSIS — Z12.31 SCREENING MAMMOGRAM FOR BREAST CANCER: ICD-10-CM

## 2025-01-23 DIAGNOSIS — E11.9 TYPE 2 DIABETES MELLITUS WITHOUT COMPLICATION, WITHOUT LONG-TERM CURRENT USE OF INSULIN: ICD-10-CM

## 2025-01-23 DIAGNOSIS — Z00.00 ANNUAL WELLNESS VISIT: Primary | ICD-10-CM

## 2025-01-23 DIAGNOSIS — R06.83 SNORING: ICD-10-CM

## 2025-01-23 PROBLEM — R73.03 PREDIABETES: Status: RESOLVED | Noted: 2023-02-07 | Resolved: 2025-01-23

## 2025-01-23 PROBLEM — E66.811 CLASS 1 OBESITY WITHOUT SERIOUS COMORBIDITY IN ADULT: Status: RESOLVED | Noted: 2023-02-07 | Resolved: 2025-01-23

## 2025-01-23 PROCEDURE — 1159F MED LIST DOCD IN RCRD: CPT | Mod: CPTII,95,, | Performed by: INTERNAL MEDICINE

## 2025-01-23 PROCEDURE — 3044F HG A1C LEVEL LT 7.0%: CPT | Mod: CPTII,95,, | Performed by: INTERNAL MEDICINE

## 2025-01-23 PROCEDURE — 1160F RVW MEDS BY RX/DR IN RCRD: CPT | Mod: CPTII,95,, | Performed by: INTERNAL MEDICINE

## 2025-01-23 PROCEDURE — 99396 PREV VISIT EST AGE 40-64: CPT | Mod: 95,,, | Performed by: INTERNAL MEDICINE

## 2025-01-23 RX ORDER — TIRZEPATIDE 2.5 MG/.5ML
2.5 INJECTION, SOLUTION SUBCUTANEOUS
Qty: 2 ML | Refills: 0 | Status: SHIPPED | OUTPATIENT
Start: 2025-01-23

## 2025-01-23 NOTE — PROGRESS NOTES
This is a telemedicine note. Patient was treated using telemedicine, real time audio and video, according to [facility] protocols. I, distant provider, conducted the visit from location identified below. The patient participated in the visit at a non-[facility] location selected by the patient (or patients representative), identified below. I am licensed in the state where the patient stated they are located. The patient (or patients representative) stated that they understood and accepted the privacy and security risks to their information at their location.   Patient was located at Home.     I, distant provider, was located at [facility].     Internal Medicine    Patient ID: [id number]     Chief Complaint: No chief complaint on file.      HPI:     Caroline Garg is a 44 y.o. female here today for an annual wellness visit. Reviewed and discussed lab results.     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 Queen; started on testosterone. Just started one week ago. No est or prog  Father with Diabetes  She feels distracted  Fasting sugar of 128, Trg 259, ALT 29  Vitamin D is improved at 46  She has hypersomnia, falls asleep really easy, snores    Health Maintenance         Date Due Completion Date    Hepatitis C Screening Never done ---    HIV Screening Never done ---    TETANUS VACCINE Never done ---    COVID-19 Vaccine (2024- season) 2024    Mammogram 2025    Hemoglobin A1c (Prediabetes) 2026    Cervical Cancer Screening 12/10/2027 12/10/2024    RSV Vaccine (Age 60+ and Pregnant patients) (1 - 1-dose 75+ series) 2057 ---             Past Medical History:   Diagnosis Date    Anemia         Past Surgical History:   Procedure Laterality Date     SECTION      CHOLECYSTECTOMY          Social History     Tobacco Use    Smoking status: Never    Smokeless tobacco: Never   Substance and Sexual Activity    Alcohol use: Yes      Comment: Rare occasion    Drug use: Never    Sexual activity: Yes     Partners: Male     Birth control/protection: Condom        Current Outpatient Medications   Medication Instructions    MOUNJARO 2.5 mg, Subcutaneous, Every 7 days, Call MD in 4 weeks to increase the dose       Review of patient's allergies indicates:  No Known Allergies     Patient Care Team:  Boy Yarbrough MD as PCP - General (Internal Medicine)  Esa Queen MD as Consulting Physician (Obstetrics and Gynecology)     Subjective:     Review of Systems   Constitutional:  Positive for unexpected weight change. Negative for activity change.   HENT:  Negative for hearing loss, rhinorrhea and trouble swallowing.    Eyes:  Negative for discharge and visual disturbance.   Respiratory:  Negative for chest tightness and wheezing.    Cardiovascular:  Negative for chest pain and palpitations.   Gastrointestinal:  Negative for blood in stool, constipation, diarrhea and vomiting.   Endocrine: Negative for polydipsia and polyuria.   Genitourinary:  Negative for difficulty urinating, dysuria, hematuria and menstrual problem.   Musculoskeletal:  Negative for arthralgias, joint swelling and neck pain.   Neurological:  Negative for weakness and headaches.   Psychiatric/Behavioral:  Negative for confusion and dysphoric mood.        12 point review of systems conducted, negative except as stated in the history of present illness. See HPI for details.    Objective:     There were no vitals taken for this visit.    Physical Exam  Constitutional:       Appearance: Normal appearance. She is obese.   HENT:      Head: Normocephalic and atraumatic.   Eyes:      Extraocular Movements: Extraocular movements intact.      Pupils: Pupils are equal, round, and reactive to light.   Pulmonary:      Effort: Pulmonary effort is normal.   Skin:     General: Skin is warm and dry.   Neurological:      General: No focal deficit present.      Mental Status: She is alert.    Psychiatric:         Mood and Affect: Mood normal.         Behavior: Behavior normal.         Labs Reviewed:     Chemistry:  Lab Results   Component Value Date     01/20/2025    K 4.3 01/20/2025    BUN 13.0 01/20/2025    CREATININE 0.80 01/20/2025    EGFRNORACEVR >60 01/20/2025    GLUCOSE 128 (H) 01/20/2025    CALCIUM 9.2 01/20/2025    ALKPHOS 80 01/20/2025    LABPROT 7.2 01/20/2025    ALBUMIN 4.0 01/20/2025    BILIDIR 0.2 02/02/2022    IBILI 0.40 02/02/2022    AST 22 01/20/2025    ALT 29 01/20/2025    TSKTLEQL12SC 46 01/20/2025    TSH 1.308 01/20/2025        Lab Results   Component Value Date    HGBA1C 5.6 01/20/2025        Hematology:  Lab Results   Component Value Date    WBC 8.88 01/20/2025    HGB 13.2 01/20/2025    HCT 40.3 01/20/2025     01/20/2025       Lipid Panel:  Lab Results   Component Value Date    CHOL 223 (H) 01/20/2025    HDL 49 01/20/2025    .00 01/20/2025    TRIG 259 (H) 01/20/2025    TOTALCHOLEST 5 01/20/2025        Urine:  Lab Results   Component Value Date    APPEARANCEUA Clear 01/20/2025    SGUA <=1.005 01/20/2025    PROTEINUA Negative 01/20/2025    KETONESUA Negative 01/20/2025    LEUKOCYTESUR Negative 01/20/2025    RBCUA <5 02/07/2023    WBCUA <5 02/07/2023    BACTERIA None Seen 02/07/2023        Assessment:       ICD-10-CM ICD-9-CM   1. Annual wellness visit  Z00.00 V70.0   2. Screening mammogram for breast cancer  Z12.31 V76.12   3. Snoring  R06.83 786.09   4. Awakens from sleep at night  G47.8 780.59   5. Type 2 diabetes mellitus without complication, without long-term current use of insulin  E11.9 250.00        Plan:     1. Annual wellness visit  Assessment & Plan:    General health maint education given  Labs reviewed   Mammo approved  RTC 3 months      2. Screening mammogram for breast cancer  -     Mammo Digital Screening Bilat; Future; Expected date: 01/23/2025    3. Snoring  -     Ambulatory referral/consult to Sleep Disorders; Future; Expected date:  01/30/2025    4. Awakens from sleep at night  -     Ambulatory referral/consult to Sleep Disorders; Future; Expected date: 01/30/2025    5. Type 2 diabetes mellitus without complication, without long-term current use of insulin  Assessment & Plan:  Mediterranean based diet  Start Mounjaro  RTC 3 months  NO CI, no SE    Orders:  -     tirzepatide (MOUNJARO) 2.5 mg/0.5 mL PnIj; Inject 2.5 mg into the skin every 7 days. Call MD in 4 weeks to increase the dose  Dispense: 2 mL; Refill: 0         Follow up in about 3 months (around 4/23/2025) for allegra. In addition to their scheduled follow up, the patient has also been instructed to follow up on as needed basis.     No future appointments.       Boy Yarbrough MD  Face to face time spent with patient exceeds minutes, over 50% of which was used for education and counseling regarding medical conditions, current medications including risk/benefit and side effects/adverse events, over the counter medications-uses/doses, home self-care and contact precautions, and red flags and indications for immediate medical attention. The patient is receptive, expresses understanding and is agreeable to plan. All questions answered

## 2025-02-04 ENCOUNTER — HOSPITAL ENCOUNTER (OUTPATIENT)
Dept: RADIOLOGY | Facility: HOSPITAL | Age: 45
Discharge: HOME | End: 2025-02-04
Attending: INTERNAL MEDICINE
Payer: COMMERCIAL

## 2025-02-04 DIAGNOSIS — Z12.31 SCREENING MAMMOGRAM FOR BREAST CANCER: ICD-10-CM

## 2025-02-04 PROCEDURE — 77063 BREAST TOMOSYNTHESIS BI: CPT | Mod: 26,,, | Performed by: RADIOLOGY

## 2025-02-04 PROCEDURE — 77067 SCR MAMMO BI INCL CAD: CPT | Mod: 26,,, | Performed by: RADIOLOGY

## 2025-02-04 PROCEDURE — 77063 BREAST TOMOSYNTHESIS BI: CPT | Mod: TC

## 2025-02-07 ENCOUNTER — OFFICE VISIT (OUTPATIENT)
Dept: NEUROLOGY | Facility: CLINIC | Age: 45
End: 2025-02-07
Payer: COMMERCIAL

## 2025-02-07 VITALS — WEIGHT: 208 LBS | BODY MASS INDEX: 38.28 KG/M2 | HEIGHT: 62 IN

## 2025-02-07 DIAGNOSIS — R06.83 SNORING: ICD-10-CM

## 2025-02-07 DIAGNOSIS — G47.8 AWAKENS FROM SLEEP AT NIGHT: ICD-10-CM

## 2025-02-07 DIAGNOSIS — G47.33 OBSTRUCTIVE SLEEP APNEA: Primary | ICD-10-CM

## 2025-02-07 PROCEDURE — 3044F HG A1C LEVEL LT 7.0%: CPT | Mod: CPTII,95,, | Performed by: PSYCHIATRY & NEUROLOGY

## 2025-02-07 PROCEDURE — 98002 SYNCH AUDIO-VIDEO NEW MOD 45: CPT | Mod: 95,,, | Performed by: PSYCHIATRY & NEUROLOGY

## 2025-02-07 NOTE — PROGRESS NOTES
Subjective     Patient ID: Caroline Garg is a 44 y.o. female.    Chief Complaint: No chief complaint on file.    HPI  The patient location is: home  The chief complaint leading to consultation is: sharonda eval    Visit type: audiovisual    Face to Face time with patient: 12 min  12 minutes of total time spent on the encounter, which includes face to face time and non-face to face time preparing to see the patient (eg, review of tests), Obtaining and/or reviewing separately obtained history, Documenting clinical information in the electronic or other health record, Independently interpreting results (not separately reported) and communicating results to the patient/family/caregiver, or Care coordination (not separately reported).     My location: [place]    Each patient to whom he or she provides medical services by telemedicine is:  (1) informed of the relationship between the physician and patient and the respective role of any other health care provider with respect to management of the patient; and (2) notified that he or she may decline to receive medical services by telemedicine and may withdraw from such care at any time.    Snoring  Frequent awakenings  Mild EDS      2/7/2025   EPWORTH SLEEPINESS SCALE   Sitting and reading 2   Watching TV 1   Sitting, inactive in a public place (e.g. a theatre or a meeting) 1   As a passenger in a car for an hour without a break 2   Lying down to rest in the afternoon when circumstances permit 3   Sitting and talking to someone 0   Sitting quietly after a lunch without alcohol 2   In a car, while stopped for a few minutes in traffic 0   Total score 11     Dry mouth  Nocturia  Finding she naps more  Falls asleep easily  Borderline HTN  Has not started mounjaro  H/o gestational DM  Review of Systems  The remainder of the 14 system ROS is noncontributory or negative unless mentioned/reviewed above.       Objective     Physical Exam  Mental Status: Alert and  oriented x3. Language is fluent with good comprehension.    Cranial Nerve: Ocular movements are intact. Face is symmetric at rest and with activation with intact sensation throughout. Hearing intact to finger rub bilaterally. Muscles of tongue and palate activate symmetrically. No dysarthria. Strength is full in sternocleidomastoid and trapezius bilaterally.    Motor: Strength is 5/5 in all four extremities both proximally and distally. Intact fine motor movements bilaterally.   Sensory: Sensation is intact to light touch, pinprick, vibration, and proprioception throughout. Romberg is negative.    Mall 4  Neck17.5       Assessment and Plan     1. Snoring  -     Ambulatory referral/consult to Sleep Disorders    2. Awakens from sleep at night  -     Ambulatory referral/consult to Sleep Disorders        HST         No follow-ups on file.

## 2025-02-20 ENCOUNTER — PROCEDURE VISIT (OUTPATIENT)
Dept: SLEEP MEDICINE | Facility: HOSPITAL | Age: 45
End: 2025-02-20
Attending: PSYCHIATRY & NEUROLOGY
Payer: COMMERCIAL

## 2025-02-20 DIAGNOSIS — G47.33 OBSTRUCTIVE SLEEP APNEA: ICD-10-CM

## 2025-02-20 PROCEDURE — G0399 HOME SLEEP TEST/TYPE 3 PORTA: HCPCS

## 2025-02-26 ENCOUNTER — PROCEDURE VISIT (OUTPATIENT)
Dept: SLEEP MEDICINE | Facility: HOSPITAL | Age: 45
End: 2025-02-26
Attending: PSYCHIATRY & NEUROLOGY
Payer: COMMERCIAL

## 2025-02-26 ENCOUNTER — TELEPHONE (OUTPATIENT)
Dept: INTERNAL MEDICINE | Facility: CLINIC | Age: 45
End: 2025-02-26
Payer: COMMERCIAL

## 2025-02-26 DIAGNOSIS — G47.33 OBSTRUCTIVE SLEEP APNEA: ICD-10-CM

## 2025-02-26 DIAGNOSIS — E11.9 TYPE 2 DIABETES MELLITUS WITHOUT COMPLICATION, WITHOUT LONG-TERM CURRENT USE OF INSULIN: Primary | ICD-10-CM

## 2025-02-26 PROCEDURE — 95811 POLYSOM 6/>YRS CPAP 4/> PARM: CPT | Mod: 26,,, | Performed by: PSYCHIATRY & NEUROLOGY

## 2025-02-26 PROCEDURE — 95811 POLYSOM 6/>YRS CPAP 4/> PARM: CPT

## 2025-02-26 RX ORDER — TIRZEPATIDE 5 MG/.5ML
5 INJECTION, SOLUTION SUBCUTANEOUS
Qty: 2 ML | Refills: 0 | Status: SHIPPED | OUTPATIENT
Start: 2025-02-26

## 2025-02-26 NOTE — TELEPHONE ENCOUNTER
----- Message from Boy Yarbrough MD sent at 2/26/2025  1:29 PM CST -----  yes  ----- Message -----  From: Adeline Lane CMA  Sent: 2/26/2025   1:11 PM CST  To: Boy Yarbrough MD    Okay to increase dose? Pt tolerating well  ----- Message -----  From: Edith Daily  Sent: 2/26/2025  11:27 AM CST  To: Marcelo Brunson Staff    Who Called: Caroline Arceo is requesting assistance/information from provider's office.Symptoms (please be specific): n/a How long has patient had these symptoms:  n/aList of preferred pharmacies on file (remove unneeded): [unfilled]If different, enter pharmacy into here including location and phone number: n/aPreferred Method of Contact: Phone CallPatient's Preferred Phone Number on File: [phone] Best Call Back Number, if different:Additional Information: medical advice, pt called to discuss/confirm increase of tirzepatide (MOUNJARO) 2.5 mg/0.5 mL PnIj 2 mL before refilling, please advise, thanks

## 2025-02-28 ENCOUNTER — OFFICE VISIT (OUTPATIENT)
Dept: NEUROLOGY | Facility: CLINIC | Age: 45
End: 2025-02-28
Payer: COMMERCIAL

## 2025-02-28 VITALS — BODY MASS INDEX: 38.28 KG/M2 | HEIGHT: 62 IN | WEIGHT: 208 LBS

## 2025-02-28 DIAGNOSIS — G47.31 CENTRAL SLEEP APNEA: ICD-10-CM

## 2025-02-28 DIAGNOSIS — G47.33 OBSTRUCTIVE SLEEP APNEA: Primary | ICD-10-CM

## 2025-02-28 RX ORDER — TESTOSTERONE GEL, 1% 10 MG/G
GEL TRANSDERMAL DAILY
COMMUNITY

## 2025-02-28 NOTE — PROGRESS NOTES
Subjective     Patient ID: Caroline Garg is a 44 y.o. female.    Chief Complaint: No chief complaint on file.    HPI  The patient location is: home  The chief complaint leading to consultation is: sage/csa    Visit type: audiovisual    Face to Face time with patient: 22 min  25 minutes of total time spent on the encounter, which includes face to face time and non-face to face time preparing to see the patient (eg, review of tests), Obtaining and/or reviewing separately obtained history, Documenting clinical information in the electronic or other health record, Independently interpreting results (not separately reported) and communicating results to the patient/family/caregiver, or Care coordination (not separately reported).     My location: [facility]    Each patient to whom he or she provides medical services by telemedicine is:  (1) informed of the relationship between the physician and patient and the respective role of any other health care provider with respect to management of the patient; and (2) notified that he or she may decline to receive medical services by telemedicine and may withdraw from such care at any time.    Sage  Csa  New dx  Ahi severe with oxygen 58% during baseline study  Had a lot of PAP emergent centrals during titration   17/11/8 bpap ST worked well  ?s answered at length  Desensitiztaion/exercise discussed  Review of Systems  The remainder of the 14 system ROS is noncontributory or negative unless mentioned/reviewed above.       Objective     Physical Exam  Mental Status: Alert and oriented x3. Language is fluent with good comprehension.    Cranial Nerve: Ocular movements are intact. Face is symmetric at rest and with activation with intact sensation throughout. Hearing intact to finger rub bilaterally. Muscles of tongue and palate activate symmetrically. No dysarthria. Strength is full in sternocleidomastoid and trapezius bilaterally.    Motor: Strength is 5/5 in all four  extremities both proximally and distally. Intact fine motor movements bilaterally.   Sensory: Sensation is intact to light touch, pinprick, vibration, and proprioception throughout. Romberg is negative.    Mall 4  Neck18       Assessment and Plan     1. Obstructive sleep apnea  -     BIPAP FOR HOME USE    2. Central sleep apnea  -     BIPAP FOR HOME USE        Bpap st  Rt 2 mos    Did well with FFM         No follow-ups on file.

## 2025-03-10 ENCOUNTER — EDUCATION (OUTPATIENT)
Dept: SLEEP MEDICINE | Facility: CLINIC | Age: 45
End: 2025-03-10
Payer: COMMERCIAL

## 2025-03-11 NOTE — PROGRESS NOTES
Patient was setup with: Aircurve ST 17/11 RR 8, stnd tube, HH    Patient was fitted with: Vitera small  Patient was instructed on the proper use and operation of equipment.  Patient verbalized understanding of instructions given.  We will follow as needed.

## 2025-03-27 ENCOUNTER — TELEPHONE (OUTPATIENT)
Dept: INTERNAL MEDICINE | Facility: CLINIC | Age: 45
End: 2025-03-27
Payer: COMMERCIAL

## 2025-03-27 DIAGNOSIS — E11.9 TYPE 2 DIABETES MELLITUS WITHOUT COMPLICATION, WITHOUT LONG-TERM CURRENT USE OF INSULIN: Primary | ICD-10-CM

## 2025-03-27 DIAGNOSIS — E11.9 TYPE 2 DIABETES MELLITUS WITHOUT COMPLICATION, WITHOUT LONG-TERM CURRENT USE OF INSULIN: ICD-10-CM

## 2025-03-27 RX ORDER — TIRZEPATIDE 5 MG/.5ML
5 INJECTION, SOLUTION SUBCUTANEOUS
Qty: 2 ML | Refills: 0 | Status: SHIPPED | OUTPATIENT
Start: 2025-03-27 | End: 2025-03-28

## 2025-03-27 NOTE — TELEPHONE ENCOUNTER
Copied from CRM #[id number]. Topic: Medications - Medication Status Check   >> Mar 27, 2025  4:00 PM Rosalba wrote:  .Type:  Patient Returning Call    Who Called:PT  Who Left Message for Patient:PT  Does the patient know what this is regarding?:tirzepatide (MOUNJARO) 5 mg/0.5 mL PnIj  Would the patient rather a call back or a response via [employer]?   Best Call Back Number:[phone]  Additional Information: Please call back patient need to know if she is suppose to stay at the same MG FOR tirzepatide (MOUNJARO) 5 mg/0.5 mL PnIj

## 2025-03-28 RX ORDER — TIRZEPATIDE 5 MG/.5ML
5 INJECTION, SOLUTION SUBCUTANEOUS
Qty: 2 ML | Refills: 0 | Status: SHIPPED | OUTPATIENT
Start: 2025-03-28

## 2025-05-07 DIAGNOSIS — E11.9 TYPE 2 DIABETES MELLITUS WITHOUT COMPLICATION, WITHOUT LONG-TERM CURRENT USE OF INSULIN: ICD-10-CM

## 2025-05-07 RX ORDER — TIRZEPATIDE 5 MG/.5ML
INJECTION, SOLUTION SUBCUTANEOUS
Qty: 2 ML | Refills: 2 | Status: SHIPPED | OUTPATIENT
Start: 2025-05-07

## 2025-05-19 ENCOUNTER — TELEPHONE (OUTPATIENT)
Dept: INTERNAL MEDICINE | Facility: CLINIC | Age: 45
End: 2025-05-19
Payer: COMMERCIAL

## 2025-05-19 ENCOUNTER — OFFICE VISIT (OUTPATIENT)
Facility: CLINIC | Age: 45
End: 2025-05-19
Payer: COMMERCIAL

## 2025-05-19 DIAGNOSIS — Z99.89 BIPAP (BIPHASIC POSITIVE AIRWAY PRESSURE) DEPENDENCE: ICD-10-CM

## 2025-05-19 DIAGNOSIS — G47.39 MIXED SLEEP APNEA: Primary | ICD-10-CM

## 2025-05-19 DIAGNOSIS — E11.9 TYPE 2 DIABETES MELLITUS WITHOUT COMPLICATION, WITHOUT LONG-TERM CURRENT USE OF INSULIN: ICD-10-CM

## 2025-05-19 DIAGNOSIS — G47.31 CENTRAL SLEEP APNEA: ICD-10-CM

## 2025-05-19 PROCEDURE — 1159F MED LIST DOCD IN RCRD: CPT | Mod: CPTII,95,, | Performed by: NURSE PRACTITIONER

## 2025-05-19 PROCEDURE — 1160F RVW MEDS BY RX/DR IN RCRD: CPT | Mod: CPTII,95,, | Performed by: NURSE PRACTITIONER

## 2025-05-19 PROCEDURE — 98005 SYNCH AUDIO-VIDEO EST LOW 20: CPT | Mod: 95,,, | Performed by: NURSE PRACTITIONER

## 2025-05-19 PROCEDURE — 3044F HG A1C LEVEL LT 7.0%: CPT | Mod: CPTII,95,, | Performed by: NURSE PRACTITIONER

## 2025-05-19 RX ORDER — TIRZEPATIDE 5 MG/.5ML
5 INJECTION, SOLUTION SUBCUTANEOUS WEEKLY
Qty: 2 ML | Refills: 2 | Status: SHIPPED | OUTPATIENT
Start: 2025-05-19

## 2025-05-19 NOTE — PROGRESS NOTES
Neurology Telemedicine Note  Patient treated using real-time Audio/Video, according to [facility] protocols  The patient (or their representative) stated that they understood & accepted the privacy/security risks to their info at their location.  Patient participated in the visit at a non-[facility] location selected by themself, or their representative.  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)    ROS:  A 14pt ROS was reviewed & is negative unless otherwise documented in the HPI    OBJECTIVE:  GENERAL: NAD, calm, cooperative, appropriate  RESP: CTAB  HEART: RRR  no LE edema  MENTAL STATUS: Oriented x4, follows commands reliably  SPEECH/LANGUAGE: Clear, coherent  gaze conjugate  No tactile or motor facial asymmetry  t/p midline  Motor: No focal weakness  Cerebellar: No tremor or dysmetria    f2f time spent w/ pt exceeds 24 min, over 50% of which was used for education & counseling regarding medical conditions, current medications including risk/benefit & side effect/adverse events, otc meds - uses/doses, home self-care & contact precautions; red flags & indications for immediate medical attention. the patient is receptive, expresses understanding and is agreeable to the plan. All questions answered.     SLEEP TESTING:  HST 2/20/25  BISI 25.6  Supine bisi 43  O2 58%  75min <88%    Titration 2/26/25  159 centrals  6 obstructives  10 mixed  23 OH    Titrated to bipap ST 17/11/8    1. Mixed sleep apnea    2. Central sleep apnea    3. BiPAP (biphasic positive airway pressure) dependence        PLAN:  Cont bipap  17/11/8  DME: [facility]  Compliant/benefiting/medically necessary  F/u 1y      Merary Galloway, [facility]-BC

## 2025-05-19 NOTE — PATIENT INSTRUCTIONS
"   Sleep Apnea in Adults   The Basics   Written by the doctors and editors at [facility]   What is sleep apnea? -- Sleep apnea is a condition that makes you stop breathing for short periods while you are asleep. There are 2 types of sleep apnea. One is called "obstructive sleep apnea," and the other is called "central sleep apnea."  In obstructive sleep apnea, you stop breathing because your throat narrows or closes (figure 1). In central sleep apnea, you stop breathing because your brain does not send the right signals to your muscles to make you breathe. When people talk about sleep apnea, they are usually referring to obstructive sleep apnea, which is what this article is about.  People with sleep apnea do not know that they stop breathing when they are asleep. But they do sometimes wake up startled or gasping for breath. They also often hear from loved ones that they snore.  What are the symptoms of sleep apnea? -- The main symptoms of sleep apnea are loud snoring, tiredness, and daytime sleepiness. Other symptoms can include:  Restless sleep  Waking up choking or gasping  Morning headaches, dry mouth, or sore throat  Waking up often to urinate  Waking up feeling unrested or groggy  Trouble thinking clearly or remembering things  Some people with sleep apnea don't have symptoms, or they don't know they have them. They might figure that it's normal to be tired or to snore a lot.  Should I see a doctor or nurse? -- Yes. If you think you might have sleep apnea, see your doctor.  Is there a test for sleep apnea? -- Yes. If your doctor or nurse suspects you have sleep apnea, they might send you for a "sleep study." Sleep studies can sometimes be done at home, but they are usually done in a sleep lab. For the study, you spend the night in the lab, and you are hooked up to different machines that monitor your heart rate, breathing, and other body functions. The results of the test will tell your doctor or nurse if you " "have the disorder.  Is there anything I can do on my own to help my sleep apnea? -- Yes. Here are some things that might help:  Stay off your back when sleeping. (This is not always practical, because people cannot control their position while asleep. Plus, it only helps some people.)  Lose weight, if you are overweight  Avoid alcohol, because it can make sleep apnea worse  How is sleep apnea treated? -- As mentioned above, weight loss can help if you are overweight or obese. But losing weight can be challenging, and it takes time to lose enough weight to help with your sleep apnea. Most people need other treatment while they work on losing weight.  The most effective treatment for sleep apnea is a device that keeps your airway open while you sleep. Treatment with this device is called "continuous positive airway pressure," or CPAP. People getting CPAP wear a face mask at night that keeps them breathing (figure 2).  If your doctor or nurse recommends a CPAP machine, try to be patient about using it. The mask might seem uncomfortable to wear at first, and the machine might seem noisy, but using the machine can really pay off. People with sleep apnea who use a CPAP machine feel more rested and generally feel better.  There is also another device that you wear in your mouth called an "oral appliance" or "mandibular advancement device." It also helps keep your airway open while you sleep. But devices do not work as well as CPAP for treating sleep apnea.  In rare cases, when nothing else helps, doctors recommend surgery to keep the airway open. Surgery to do this is not always effective, and even when it is, the problem can come back.  Is sleep apnea dangerous? -- It can be. People with sleep apnea do not get good-quality sleep, so they are often tired and not alert. This puts them at risk for car accidents and other types of accidents. Plus, studies show that people with sleep apnea are more likely than others to have " high blood pressure, heart attacks, and other serious heart problems. In people with severe sleep apnea, getting treated (for example, with a CPAP machine) can help prevent some of these problems.  All topics are updated as new evidence becomes available and our peer review process is complete.  This topic retrieved from [employer] on: Sep 21, 2021.  Topic 99564 Version 12.0  Release: 29.4.2 - C29.263  © 2021 UpToDate, Inc. and/or its affiliates. All rights reserved.  figure 1: Airway in a person with sleep apnea     Normally when a person sleeps, the airway remains open, and air can pass from the nose and mouth to the lungs. In a person with sleep apnea, parts of the throat and mouth drop into the airway and block off the flow of air. This can cause loud snoring and interrupt breathing for short periods.  Graphic 55237 Version 5.0    figure 2: Continuous positive airway pressure (CPAP) for sleep apnea     The CPAP mask gently blows air into your nose while you sleep. It puts just enough pressure on your airway to keep it from closing. The mask in this picture fits over just the nose. Other CPAP devices have masks that fit over the nose and mouth.  Graphic 92032 Version 5.0    Consumer Information Use and Disclaimer   This information is not specific medical advice and does not replace information you receive from your health care provider. This is only a brief summary of general information. It does NOT include all information about conditions, illnesses, injuries, tests, procedures, treatments, therapies, discharge instructions or life-style choices that may apply to you. You must talk with your health care provider for complete information about your health and treatment options. This information should not be used to decide whether or not to accept your health care provider's advice, instructions or recommendations. Only your health care provider has the knowledge and training to provide advice that is right for  you. The use of this information is governed by the [employer] End User License Agreement, available at [email or web address]://[email or web address].Mimetas/en/[email or web address]/about/kalli.The use of [employer] content is governed by the [employer] Terms of Use. ©2021 UpToDate, Inc. All rights reserved.  Copyright   © 2021 UpToDate, Inc. and/or its affiliates. All rights reserved.

## 2025-05-19 NOTE — TELEPHONE ENCOUNTER
----- Message from Tracie sent at 5/19/2025  1:03 PM CDT -----  Regarding: pharmacy  .Who Called: [facility] to request assistance with RxPharmacy name and phone number: [facility]/pharmacy #[id number] - [place] Phone: [phone]: [phone] contact: AmyPatient Name: Law AntillPrescription Name: tirzepatide (MOUNJARO) 5 mg/0.5 mL PnIj What do they need to clarify?:needs a verbal authorization from  for med are a prescription for Monjouro Preferred Method of Contact: Phone CallPatient's Preferred Phone Number on File: [phone] Best Call Back Number, if different:Additional Information: requesting a call back ABL take a message    Spoke to Ruchi from [facility] who stated that RX read only [place] Tirzepatide and they needed to know if it was for Mounjaro of Zepbound. I let her know it was for Mounjaro. RX re-sent as DARRON to see if this will make the Mounjaro name populate for them.

## 2025-06-16 ENCOUNTER — TELEPHONE (OUTPATIENT)
Dept: INTERNAL MEDICINE | Facility: CLINIC | Age: 45
End: 2025-06-16
Payer: COMMERCIAL

## 2025-06-16 DIAGNOSIS — E11.9 TYPE 2 DIABETES MELLITUS WITHOUT COMPLICATION, WITHOUT LONG-TERM CURRENT USE OF INSULIN: Primary | ICD-10-CM

## 2025-06-16 NOTE — TELEPHONE ENCOUNTER
Copied from CRM #[id number]. Topic: Medications - Medication Refill  >> Jun 16, 2025 11:46 AM Keren wrote:  Who Called: Caroline Hoskins Antill    Refill or New Rx:Refill  RX Name and Strength:MOUNJARO 7.5 mg/0.5 mL PnIj  How is the patient currently taking it? (ex. 1XDay):Route: Inject 5 mg into the skin once a week.  Is this a 30 day or 90 day RX:2mL  Local or Mail Order:local  List of preferred pharmacies on file (remove unneeded): [facility]/pharmacy #[id number] - [place] - [address]  If different Pharmacy is requested, enter Pharmacy information here including location and phone number:    Ordering Provider:PCP      Preferred Method of Contact: Phone Call  Patient's Preferred Phone Number on File: [phone]   Best Call Back Number, if different:  Additional Information: PT is wondering if she should make a f/u appointment.    LOV: 1/23/25  NOV: None scheduled  Last fill: 5/20/25    RX sent to pharmacy on file for 30 days. Pt needs appt. Spoke to pt and appt scheduled for 6/25/25 @ 8:00 with MANDO Mckenzie. Labs ordered.

## 2025-06-18 ENCOUNTER — TELEPHONE (OUTPATIENT)
Dept: INTERNAL MEDICINE | Facility: CLINIC | Age: 45
End: 2025-06-18
Payer: COMMERCIAL

## 2025-06-18 DIAGNOSIS — E11.9 TYPE 2 DIABETES MELLITUS WITHOUT COMPLICATION, WITHOUT LONG-TERM CURRENT USE OF INSULIN: ICD-10-CM

## 2025-06-18 NOTE — TELEPHONE ENCOUNTER
Copied from CRM #[id number]. Topic: Medications - Medication Question  >> Jun 18, 2025 10:14 AM Mariana wrote:  .Who Called: Caroline Aidee Hell    Caller is requesting assistance/information from provider's office.    Symptoms (please be specific): N/A   How long has patient had these symptoms: N/A  List of preferred pharmacies on file (remove unneeded): [facility]/pharmacy #[id number] - [place] - [address]  [address] [address]  Phone: [phone] Fax: [phone]    Preferred Method of Contact: Phone Call    Patient's Preferred Phone Number on File: [phone]     Best Call Back Number, if different:    Additional Information: Pt called stating pharmacy did not received her prescription for tirzepatide 7.5 mg/0.5 mL PnIj. Pt is requesting prescription please be re-faxed to pharmacy. Please advise, thank you.

## 2025-06-19 RX ORDER — TIRZEPATIDE 7.5 MG/.5ML
7.5 INJECTION, SOLUTION SUBCUTANEOUS
Qty: 2 ML | Refills: 1 | Status: SHIPPED | OUTPATIENT
Start: 2025-06-19

## 2025-06-19 NOTE — TELEPHONE ENCOUNTER
Copied from CRM #[id number]. Topic: Medications - New Medication Request  >> Jun 19, 2025 11:48 AM Mariana wrote:  .Who Called: Carolineselena Hell    Caller is requesting assistance/information from provider's office.    Symptoms (please be specific): N/A   How long has patient had these symptoms:  N/A    List of preferred pharmacies on file (remove unneeded): [facility]/pharmacy #[id number] - [place] - [address]  [address] [address]  Phone: [phone]   Fax: [phone]    Preferred Method of Contact: Phone Call    Patient's Preferred Phone Number on File: [phone]     Best Call Back Number, if different:    Additional Information: Pt called stating pharmacy informed her that her prescription for tirzepatide 7.5 mg/0.5 mL PnIj was written incorrectly and in order for her medication to be covered it has to say specifically say Mounjaro and not tirzepatide. Pt is asking for prescription to resent with correct medication name. Please advise, thank you.

## 2025-06-23 ENCOUNTER — LAB VISIT (OUTPATIENT)
Dept: LAB | Facility: HOSPITAL | Age: 45
End: 2025-06-23
Attending: INTERNAL MEDICINE
Payer: COMMERCIAL

## 2025-06-23 DIAGNOSIS — E11.9 TYPE 2 DIABETES MELLITUS WITHOUT COMPLICATION, WITHOUT LONG-TERM CURRENT USE OF INSULIN: ICD-10-CM

## 2025-06-23 LAB
ALBUMIN SERPL-MCNC: 4.1 G/DL (ref 3.5–5)
ALBUMIN/GLOB SERPL: 1.3 RATIO (ref 1.1–2)
ALP SERPL-CCNC: 61 UNIT/L (ref 40–150)
ALT SERPL-CCNC: 20 UNIT/L (ref 0–55)
ANION GAP SERPL CALC-SCNC: 8 MEQ/L
AST SERPL-CCNC: 19 UNIT/L (ref 11–45)
BILIRUB SERPL-MCNC: 0.8 MG/DL
BUN SERPL-MCNC: 15 MG/DL (ref 7–18.7)
CALCIUM SERPL-MCNC: 8.9 MG/DL (ref 8.4–10.2)
CHLORIDE SERPL-SCNC: 106 MMOL/L (ref 98–107)
CHOLEST SERPL-MCNC: 178 MG/DL
CHOLEST/HDLC SERPL: 5 {RATIO} (ref 0–5)
CO2 SERPL-SCNC: 21 MMOL/L (ref 22–29)
CREAT SERPL-MCNC: 0.7 MG/DL (ref 0.55–1.02)
CREAT/UREA NIT SERPL: 21
EST. AVERAGE GLUCOSE BLD GHB EST-MCNC: 99.7 MG/DL
GFR SERPLBLD CREATININE-BSD FMLA CKD-EPI: >60 ML/MIN/1.73/M2
GLOBULIN SER-MCNC: 3.1 GM/DL (ref 2.4–3.5)
GLUCOSE SERPL-MCNC: 90 MG/DL (ref 74–100)
HBA1C MFR BLD: 5.1 %
HDLC SERPL-MCNC: 36 MG/DL (ref 35–60)
LDLC SERPL CALC-MCNC: 109 MG/DL (ref 50–140)
POTASSIUM SERPL-SCNC: 4.1 MMOL/L (ref 3.5–5.1)
PROT SERPL-MCNC: 7.2 GM/DL (ref 6.4–8.3)
SODIUM SERPL-SCNC: 135 MMOL/L (ref 136–145)
TRIGL SERPL-MCNC: 165 MG/DL (ref 37–140)
VLDLC SERPL CALC-MCNC: 33 MG/DL

## 2025-06-23 PROCEDURE — 80053 COMPREHEN METABOLIC PANEL: CPT

## 2025-06-23 PROCEDURE — 83036 HEMOGLOBIN GLYCOSYLATED A1C: CPT

## 2025-06-23 PROCEDURE — 80061 LIPID PANEL: CPT

## 2025-06-23 PROCEDURE — 36415 COLL VENOUS BLD VENIPUNCTURE: CPT

## 2025-06-25 ENCOUNTER — OFFICE VISIT (OUTPATIENT)
Dept: INTERNAL MEDICINE | Facility: CLINIC | Age: 45
End: 2025-06-25
Payer: COMMERCIAL

## 2025-06-25 VITALS
OXYGEN SATURATION: 99 % | BODY MASS INDEX: 34.78 KG/M2 | SYSTOLIC BLOOD PRESSURE: 118 MMHG | WEIGHT: 189 LBS | HEIGHT: 62 IN | DIASTOLIC BLOOD PRESSURE: 72 MMHG | TEMPERATURE: 98 F | HEART RATE: 73 BPM

## 2025-06-25 DIAGNOSIS — E11.9 TYPE 2 DIABETES MELLITUS WITHOUT COMPLICATION, WITHOUT LONG-TERM CURRENT USE OF INSULIN: Primary | ICD-10-CM

## 2025-06-25 DIAGNOSIS — E78.2 MIXED HYPERLIPIDEMIA: ICD-10-CM

## 2025-06-25 DIAGNOSIS — E66.811 OBESITY (BMI 30.0-34.9): ICD-10-CM

## 2025-06-25 PROBLEM — E88.819 INSULIN RESISTANCE: Status: RESOLVED | Noted: 2023-02-07 | Resolved: 2025-06-25

## 2025-06-25 PROBLEM — E78.5 HLD (HYPERLIPIDEMIA): Status: ACTIVE | Noted: 2025-06-25

## 2025-06-25 RX ORDER — MAGNESIUM 200 MG
200 TABLET ORAL DAILY
COMMUNITY

## 2025-06-25 RX ORDER — ACETAMINOPHEN 500 MG
5000 TABLET ORAL DAILY
COMMUNITY

## 2025-06-25 NOTE — PROGRESS NOTES
"  Internal Medicine    Patient ID: [id number]     Chief Complaint: Follow-up (DM)      HPI:     Caroline Garg is a 44 y.o. female here today for DM follow-up.    Labs reviewed & discussed. LDL with improvement but remains above goal. Reports that she has recently incorporated exercise into her routine.     She reports that she has been having fasting glucose levels that have been above 100 when checking at home but overall improved.     Currently on 7.5 mg of Mounjaro. Starting weight 215 lb. Down to 189 lb. 12% weight loss so far. Tolerating the medication well.     Denies other complaints.     Past Medical History:   Diagnosis Date    Anemia         Past Surgical History:   Procedure Laterality Date     SECTION  05/06/15    Also 15 and 13    CHOLECYSTECTOMY          Social History     Tobacco Use    Smoking status: Never    Smokeless tobacco: Never   Substance and Sexual Activity    Alcohol use: Yes     Comment: Rare occasion    Drug use: Never    Sexual activity: Yes     Partners: Male     Birth control/protection: Condom        Current Outpatient Medications   Medication Instructions    cholecalciferol (vitamin D3) (VITAMIN D3) 5,000 Units, Daily    magnesium 200 mg, Daily    MOUNJARO 7.5 mg, Subcutaneous, Every 7 days    multivit with minerals/lutein (MULTIVITAMIN 50 PLUS ORAL) 1 capsule, Daily    testosterone (ANDROGEL) 1 % (50 mg/5 gram) GlPk Daily       Review of patient's allergies indicates:  No Known Allergies     Patient Care Team:  Boy Yarbrough MD as PCP - General (Internal Medicine)  Esa Queen MD as Consulting Physician (Obstetrics and Gynecology)     Subjective:     Review of Systems    12 point review of systems conducted, negative except as stated in the history of present illness. See HPI for details.    Objective:     Visit Vitals  /72   Pulse 73   Temp 97.8 °F (36.6 °C) (Temporal)   Ht 5' 2" (1.575 m)   Wt 85.7 kg (189 lb)   SpO2 99%   BMI " 34.57 kg/m²       Physical Exam  Constitutional:       Appearance: Normal appearance. She is obese.   HENT:      Head: Normocephalic and atraumatic.   Eyes:      Extraocular Movements: Extraocular movements intact.      Pupils: Pupils are equal, round, and reactive to light.   Cardiovascular:      Rate and Rhythm: Normal rate and regular rhythm.   Pulmonary:      Effort: Pulmonary effort is normal.      Breath sounds: Normal breath sounds.   Skin:     General: Skin is warm and dry.   Neurological:      General: No focal deficit present.      Mental Status: She is alert.   Psychiatric:         Mood and Affect: Mood normal.         Labs Reviewed:     Chemistry:  Lab Results   Component Value Date     (L) 06/23/2025    K 4.1 06/23/2025    BUN 15.0 06/23/2025    CREATININE 0.70 06/23/2025    EGFRNORACEVR >60 06/23/2025    CALCIUM 8.9 06/23/2025    ALKPHOS 61 06/23/2025    ALBUMIN 4.1 06/23/2025    BILIDIR 0.2 02/02/2022    IBILI 0.40 02/02/2022    AST 19 06/23/2025    ALT 20 06/23/2025    ZPSYGYTH87TG 46 01/20/2025    TSH 1.308 01/20/2025        Lab Results   Component Value Date    HGBA1C 5.1 06/23/2025        Hematology:  Lab Results   Component Value Date    WBC 8.88 01/20/2025    HGB 13.2 01/20/2025    HCT 40.3 01/20/2025     01/20/2025       Lipid Panel:  Lab Results   Component Value Date    CHOL 178 06/23/2025    HDL 36 06/23/2025    .00 06/23/2025    TRIG 165 (H) 06/23/2025    TOTALCHOLEST 5 06/23/2025        Urine:  Lab Results   Component Value Date    APPEARANCEUA Clear 01/20/2025    SGUA <=1.005 01/20/2025    PROTEINUA Negative 01/20/2025    KETONESUA Negative 01/20/2025    LEUKOCYTESUR Negative 01/20/2025    RBCUA <5 02/07/2023    WBCUA <5 02/07/2023    BACTERIA None Seen 02/07/2023        Assessment:       ICD-10-CM ICD-9-CM   1. Type 2 diabetes mellitus without complication, without long-term current use of insulin  E11.9 250.00   2. Mixed hyperlipidemia  E78.2 272.2   3. Obesity (BMI  30.0-34.9)  E66.811 278.00        Plan:     1. Type 2 diabetes mellitus without complication, without long-term current use of insulin  Assessment & Plan:  Lab Results   Component Value Date    HGBA1C 5.1 06/23/2025    HGBA1C 5.6 01/20/2025    .00 06/23/2025    CREATININE 0.70 06/23/2025      Diabetes Medications              MOUNJARO 7.5 mg/0.5 mL PnIj Inject 7.5 mg into the skin every 7 days.     Tolerating Mounjaro 7.5 mg weekly. Reports that she would like to stay at the current dosage for now. Will call the office if wanting to increase.   Follow ADA Diet. Avoid soda, simple sweets, and limit rice/pasta/breads/starches (no more than 45-50 grams per meal).  Maintain healthy weight with goal BMI <30.  Exercise 5 times per week for 30 minutes per day.  Stressed importance of daily foot exams.  Stressed importance of annual dilated eye exam.            2. Mixed hyperlipidemia  Assessment & Plan:  Lab Results   Component Value Date    .00 06/23/2025    TRIG 165 (H) 06/23/2025    HDL 36 06/23/2025    TOTALCHOLEST 5 06/23/2025     Declines statin at this time. Reports that she has recently started incorporating exercise into her routine. Would like to recheck in 3 months.   Stressed importance of dietary modifications. Follow a low cholesterol, low saturated fat diet with less that 200mg of cholesterol a day.  Avoid fried foods and high saturated fats (high saturated fats less than 7% of calories).  Add Flax Seed/Fish Oil supplements to diet. Increase dietary fiber.  Regular exercise can reduce LDL and raise HDL. Stressed importance of physical activity 5 times per week for 30 minutes per day.         3. Obesity (BMI 30.0-34.9)  Assessment & Plan:  Body mass index is 34.57 kg/m².  Continue Mounjaro 7.5 mg weekly.   Starting weight 215 lb. Down to 189 lb. 12%   Goal BMI <30.  Exercise 5 times a week for 30 minutes per day.  Avoid soda, simple sugars, excessive rice, potatoes or bread. Limit fast foods and  fried foods.  Choose complex carbs in moderation (example: green vegetables, beans, oatmeal). Eat plenty of fresh fruits and vegetables with lean meats daily.  Do not skip meals. Eat a balanced portion size.  Avoid fad diets. Consider permanent healthy life style changes.              Follow up in about 3 months (around 9/25/2025), or [facility] f/u. In addition to their scheduled follow up, the patient has also been instructed to follow up on as needed basis.     Future Appointments   Date Time Provider Department Center   9/25/2025  8:00 AM Pauline Alvarado FNP [id number]        MANDO Culver

## 2025-06-25 NOTE — ASSESSMENT & PLAN NOTE
Lab Results   Component Value Date    .00 06/23/2025    TRIG 165 (H) 06/23/2025    HDL 36 06/23/2025    TOTALCHOLEST 5 06/23/2025     Declines statin at this time. Reports that she has recently started incorporating exercise into her routine. Would like to recheck in 3 months.   Stressed importance of dietary modifications. Follow a low cholesterol, low saturated fat diet with less that 200mg of cholesterol a day.  Avoid fried foods and high saturated fats (high saturated fats less than 7% of calories).  Add Flax Seed/Fish Oil supplements to diet. Increase dietary fiber.  Regular exercise can reduce LDL and raise HDL. Stressed importance of physical activity 5 times per week for 30 minutes per day.

## 2025-06-25 NOTE — ASSESSMENT & PLAN NOTE
Body mass index is 34.57 kg/m².  Continue Mounjaro 7.5 mg weekly.   Starting weight 215 lb. Down to 189 lb. 12%   Goal BMI <30.  Exercise 5 times a week for 30 minutes per day.  Avoid soda, simple sugars, excessive rice, potatoes or bread. Limit fast foods and fried foods.  Choose complex carbs in moderation (example: green vegetables, beans, oatmeal). Eat plenty of fresh fruits and vegetables with lean meats daily.  Do not skip meals. Eat a balanced portion size.  Avoid fad diets. Consider permanent healthy life style changes.

## 2025-06-25 NOTE — ASSESSMENT & PLAN NOTE
Lab Results   Component Value Date    HGBA1C 5.1 06/23/2025    HGBA1C 5.6 01/20/2025    .00 06/23/2025    CREATININE 0.70 06/23/2025      Diabetes Medications              MOUNJARO 7.5 mg/0.5 mL PnIj Inject 7.5 mg into the skin every 7 days.     Tolerating Mounjaro 7.5 mg weekly. Reports that she would like to stay at the current dosage for now. Will call the office if wanting to increase.   Follow ADA Diet. Avoid soda, simple sweets, and limit rice/pasta/breads/starches (no more than 45-50 grams per meal).  Maintain healthy weight with goal BMI <30.  Exercise 5 times per week for 30 minutes per day.  Stressed importance of daily foot exams.  Stressed importance of annual dilated eye exam.

## 2025-07-10 ENCOUNTER — TELEPHONE (OUTPATIENT)
Dept: INTERNAL MEDICINE | Facility: CLINIC | Age: 45
End: 2025-07-10
Payer: COMMERCIAL

## 2025-07-10 DIAGNOSIS — E11.9 TYPE 2 DIABETES MELLITUS WITHOUT COMPLICATION, WITHOUT LONG-TERM CURRENT USE OF INSULIN: ICD-10-CM

## 2025-07-10 RX ORDER — TIRZEPATIDE 7.5 MG/.5ML
7.5 INJECTION, SOLUTION SUBCUTANEOUS
Qty: 2 ML | Refills: 1 | Status: SHIPPED | OUTPATIENT
Start: 2025-07-10

## 2025-07-10 NOTE — TELEPHONE ENCOUNTER
Copied from CRM #[id number]. Topic: Medications - Medication Refill  >> Jul 10, 2025  9:17 AM Mariana wrote:  .Who Called: Caroline Hoskins Antill    Refill or New Rx:Refill    RX Name and Strength: MOUNJARO 7.5 mg/0.5 mL PnIj    How is the patient currently taking it? (ex. 1XDay): Sig - Route: Inject 7.5 mg into the skin every 7 days. - Subcutaneous    Is this a 30 day or 90 day RX: N/A    Local or Mail Order: Local    List of preferred pharmacies on file (remove unneeded): [facility]/pharmacy #[id number] - [place] - [address]   Phone: [phone]  Fax: [phone]    Ordering Provider: Dr. Godoy    Preferred Method of Contact: Phone Call    Patient's Preferred Phone Number on File: [phone]     Best Call Back Number, if different:    Additional Information: N/A    LOV: 6/25/25  NOV: 9/25/25  Last fill: 6/20/25    RX sent to pharmacy on file.

## 2025-07-29 ENCOUNTER — PATIENT MESSAGE (OUTPATIENT)
Dept: INTERNAL MEDICINE | Facility: CLINIC | Age: 45
End: 2025-07-29
Payer: COMMERCIAL